# Patient Record
Sex: FEMALE | Race: WHITE | NOT HISPANIC OR LATINO
[De-identification: names, ages, dates, MRNs, and addresses within clinical notes are randomized per-mention and may not be internally consistent; named-entity substitution may affect disease eponyms.]

---

## 2021-10-01 ENCOUNTER — APPOINTMENT (OUTPATIENT)
Dept: DISASTER EMERGENCY | Facility: CLINIC | Age: 51
End: 2021-10-01

## 2021-10-01 DIAGNOSIS — Z01.818 ENCOUNTER FOR OTHER PREPROCEDURAL EXAMINATION: ICD-10-CM

## 2021-10-01 PROBLEM — Z00.00 ENCOUNTER FOR PREVENTIVE HEALTH EXAMINATION: Status: ACTIVE | Noted: 2021-10-01

## 2021-10-02 LAB — SARS-COV-2 N GENE NPH QL NAA+PROBE: NOT DETECTED

## 2021-10-03 ENCOUNTER — TRANSCRIPTION ENCOUNTER (OUTPATIENT)
Age: 51
End: 2021-10-03

## 2021-10-04 ENCOUNTER — OUTPATIENT (OUTPATIENT)
Dept: OUTPATIENT SERVICES | Facility: HOSPITAL | Age: 51
LOS: 1 days | Discharge: HOME | End: 2021-10-04

## 2021-10-04 VITALS
HEART RATE: 93 BPM | WEIGHT: 203.05 LBS | OXYGEN SATURATION: 97 % | HEIGHT: 70 IN | DIASTOLIC BLOOD PRESSURE: 75 MMHG | RESPIRATION RATE: 169 BRPM | TEMPERATURE: 98 F | SYSTOLIC BLOOD PRESSURE: 136 MMHG

## 2021-10-04 LAB
BASOPHILS # BLD AUTO: 0.01 K/UL — SIGNIFICANT CHANGE UP (ref 0–0.2)
BASOPHILS NFR BLD AUTO: 0.1 % — SIGNIFICANT CHANGE UP (ref 0–2)
EOSINOPHIL # BLD AUTO: 0 K/UL — SIGNIFICANT CHANGE UP (ref 0–0.5)
EOSINOPHIL NFR BLD AUTO: 0 % — SIGNIFICANT CHANGE UP (ref 0–6)
GLUCOSE BLDC GLUCOMTR-MCNC: 140 MG/DL — HIGH (ref 70–99)
GLUCOSE BLDC GLUCOMTR-MCNC: 143 MG/DL — HIGH (ref 70–99)
GLUCOSE BLDC GLUCOMTR-MCNC: 147 MG/DL — HIGH (ref 70–99)
GLUCOSE BLDC GLUCOMTR-MCNC: 171 MG/DL — HIGH (ref 70–99)
HCT VFR BLD CALC: 47 % — HIGH (ref 34.5–45)
HGB BLD-MCNC: 15.1 G/DL — SIGNIFICANT CHANGE UP (ref 11.5–15.5)
IMM GRANULOCYTES NFR BLD AUTO: 0.5 % — SIGNIFICANT CHANGE UP (ref 0–1.5)
LYMPHOCYTES # BLD AUTO: 0.85 K/UL — LOW (ref 1–3.3)
LYMPHOCYTES # BLD AUTO: 9.7 % — LOW (ref 13–44)
MCHC RBC-ENTMCNC: 29.6 PG — SIGNIFICANT CHANGE UP (ref 27–34)
MCHC RBC-ENTMCNC: 32.1 GM/DL — SIGNIFICANT CHANGE UP (ref 32–36)
MCV RBC AUTO: 92.2 FL — SIGNIFICANT CHANGE UP (ref 80–100)
MONOCYTES # BLD AUTO: 0.11 K/UL — SIGNIFICANT CHANGE UP (ref 0–0.9)
MONOCYTES NFR BLD AUTO: 1.3 % — LOW (ref 2–14)
NEUTROPHILS # BLD AUTO: 7.71 K/UL — HIGH (ref 1.8–7.4)
NEUTROPHILS NFR BLD AUTO: 88.4 % — HIGH (ref 43–77)
NRBC # BLD: 0 /100 WBCS — SIGNIFICANT CHANGE UP (ref 0–0)
PLATELET # BLD AUTO: 333 K/UL — SIGNIFICANT CHANGE UP (ref 150–400)
RBC # BLD: 5.1 M/UL — SIGNIFICANT CHANGE UP (ref 3.8–5.2)
RBC # FLD: 13.6 % — SIGNIFICANT CHANGE UP (ref 10.3–14.5)
WBC # BLD: 8.72 K/UL — SIGNIFICANT CHANGE UP (ref 3.8–10.5)
WBC # FLD AUTO: 8.72 K/UL — SIGNIFICANT CHANGE UP (ref 3.8–10.5)

## 2021-10-04 PROCEDURE — 99285 EMERGENCY DEPT VISIT HI MDM: CPT

## 2021-10-04 PROCEDURE — 93010 ELECTROCARDIOGRAM REPORT: CPT

## 2021-10-04 RX ORDER — SODIUM CHLORIDE 9 MG/ML
1000 INJECTION INTRAMUSCULAR; INTRAVENOUS; SUBCUTANEOUS ONCE
Refills: 0 | Status: COMPLETED | OUTPATIENT
Start: 2021-10-04 | End: 2021-10-04

## 2021-10-04 RX ORDER — FAMOTIDINE 10 MG/ML
20 INJECTION INTRAVENOUS ONCE
Refills: 0 | Status: COMPLETED | OUTPATIENT
Start: 2021-10-04 | End: 2021-10-04

## 2021-10-04 RX ORDER — ONDANSETRON 8 MG/1
4 TABLET, FILM COATED ORAL ONCE
Refills: 0 | Status: COMPLETED | OUTPATIENT
Start: 2021-10-04 | End: 2021-10-04

## 2021-10-04 NOTE — ED ADULT NURSE NOTE - CHIEF COMPLAINT QUOTE
Pt transfer from [facility] s/p placement of external dilaudid pump to rt shoulder today (currently turned off), unable to control N/V per report after multiple med s administered. Also has insulin pump.

## 2021-10-04 NOTE — ED ADULT NURSE NOTE - OBJECTIVE STATEMENT
Patient w/ intractable Patient w/ intractable nausea and vomitting episodes s/p Dilaudid pump implant at [facility], states has mild abdominal pain, last vomitting episode at 5:30pm today.

## 2021-10-04 NOTE — ED ADULT TRIAGE NOTE - CHIEF COMPLAINT QUOTE
Pt transfer from [facility] s/p placement of external dilaudid pump to rt shoulder today (currently turned off), unable to control N/V per report after multiple med s administered. Also has insulin pump.

## 2021-10-05 ENCOUNTER — INPATIENT (INPATIENT)
Facility: HOSPITAL | Age: 51
LOS: 1 days | Discharge: HOME | DRG: 392 | End: 2021-10-07
Attending: STUDENT IN AN ORGANIZED HEALTH CARE EDUCATION/TRAINING PROGRAM | Admitting: STUDENT IN AN ORGANIZED HEALTH CARE EDUCATION/TRAINING PROGRAM
Payer: MEDICARE

## 2021-10-05 DIAGNOSIS — E27.1 PRIMARY ADRENOCORTICAL INSUFFICIENCY: ICD-10-CM

## 2021-10-05 DIAGNOSIS — R10.9 UNSPECIFIED ABDOMINAL PAIN: ICD-10-CM

## 2021-10-05 DIAGNOSIS — E11.9 TYPE 2 DIABETES MELLITUS WITHOUT COMPLICATIONS: ICD-10-CM

## 2021-10-05 DIAGNOSIS — I10 ESSENTIAL (PRIMARY) HYPERTENSION: ICD-10-CM

## 2021-10-05 DIAGNOSIS — Z29.9 ENCOUNTER FOR PROPHYLACTIC MEASURES, UNSPECIFIED: ICD-10-CM

## 2021-10-05 DIAGNOSIS — K21.9 GASTRO-ESOPHAGEAL REFLUX DISEASE WITHOUT ESOPHAGITIS: ICD-10-CM

## 2021-10-05 DIAGNOSIS — R11.2 NAUSEA WITH VOMITING, UNSPECIFIED: ICD-10-CM

## 2021-10-05 LAB
A1C WITH ESTIMATED AVERAGE GLUCOSE RESULT: 6.3 % — HIGH (ref 4–5.6)
ALBUMIN SERPL ELPH-MCNC: 4.2 G/DL — SIGNIFICANT CHANGE UP (ref 3.3–5)
ALP SERPL-CCNC: 90 U/L — SIGNIFICANT CHANGE UP (ref 40–120)
ALT FLD-CCNC: 53 U/L — HIGH (ref 10–45)
ANION GAP SERPL CALC-SCNC: 14 MMOL/L — SIGNIFICANT CHANGE UP (ref 5–17)
ANION GAP SERPL CALC-SCNC: 14 MMOL/L — SIGNIFICANT CHANGE UP (ref 5–17)
APPEARANCE UR: ABNORMAL
APPEARANCE UR: ABNORMAL
AST SERPL-CCNC: 17 U/L — SIGNIFICANT CHANGE UP (ref 10–40)
BILIRUB SERPL-MCNC: 0.7 MG/DL — SIGNIFICANT CHANGE UP (ref 0.2–1.2)
BILIRUB UR-MCNC: NEGATIVE — SIGNIFICANT CHANGE UP
BILIRUB UR-MCNC: NEGATIVE — SIGNIFICANT CHANGE UP
BUN SERPL-MCNC: 14 MG/DL — SIGNIFICANT CHANGE UP (ref 7–23)
BUN SERPL-MCNC: 15 MG/DL — SIGNIFICANT CHANGE UP (ref 7–23)
CALCIUM SERPL-MCNC: 9.4 MG/DL — SIGNIFICANT CHANGE UP (ref 8.4–10.5)
CALCIUM SERPL-MCNC: 9.9 MG/DL — SIGNIFICANT CHANGE UP (ref 8.4–10.5)
CHLORIDE SERPL-SCNC: 101 MMOL/L — SIGNIFICANT CHANGE UP (ref 96–108)
CHLORIDE SERPL-SCNC: 102 MMOL/L — SIGNIFICANT CHANGE UP (ref 96–108)
CHOLEST SERPL-MCNC: 231 MG/DL — HIGH
CO2 SERPL-SCNC: 22 MMOL/L — SIGNIFICANT CHANGE UP (ref 22–31)
CO2 SERPL-SCNC: 23 MMOL/L — SIGNIFICANT CHANGE UP (ref 22–31)
COLOR SPEC: YELLOW — SIGNIFICANT CHANGE UP
COLOR SPEC: YELLOW — SIGNIFICANT CHANGE UP
CREAT SERPL-MCNC: 0.62 MG/DL — SIGNIFICANT CHANGE UP (ref 0.5–1.3)
CREAT SERPL-MCNC: 0.62 MG/DL — SIGNIFICANT CHANGE UP (ref 0.5–1.3)
DIFF PNL FLD: NEGATIVE — SIGNIFICANT CHANGE UP
DIFF PNL FLD: NEGATIVE — SIGNIFICANT CHANGE UP
ESTIMATED AVERAGE GLUCOSE: 134 MG/DL — HIGH (ref 68–114)
GLUCOSE BLDC GLUCOMTR-MCNC: 124 MG/DL — HIGH (ref 70–99)
GLUCOSE BLDC GLUCOMTR-MCNC: 131 MG/DL — HIGH (ref 70–99)
GLUCOSE BLDC GLUCOMTR-MCNC: 136 MG/DL — HIGH (ref 70–99)
GLUCOSE SERPL-MCNC: 161 MG/DL — HIGH (ref 70–99)
GLUCOSE SERPL-MCNC: 203 MG/DL — HIGH (ref 70–99)
GLUCOSE UR QL: >=1000
GLUCOSE UR QL: >=1000
HCT VFR BLD CALC: 41.5 % — SIGNIFICANT CHANGE UP (ref 34.5–45)
HDLC SERPL-MCNC: 68 MG/DL — SIGNIFICANT CHANGE UP
HGB BLD-MCNC: 13.5 G/DL — SIGNIFICANT CHANGE UP (ref 11.5–15.5)
KETONES UR-MCNC: 40 MG/DL
KETONES UR-MCNC: 40 MG/DL
LEUKOCYTE ESTERASE UR-ACNC: NEGATIVE — SIGNIFICANT CHANGE UP
LEUKOCYTE ESTERASE UR-ACNC: NEGATIVE — SIGNIFICANT CHANGE UP
LIPID PNL WITH DIRECT LDL SERPL: 125 MG/DL — HIGH
MAGNESIUM SERPL-MCNC: 1.9 MG/DL — SIGNIFICANT CHANGE UP (ref 1.6–2.6)
MCHC RBC-ENTMCNC: 30.1 PG — SIGNIFICANT CHANGE UP (ref 27–34)
MCHC RBC-ENTMCNC: 32.5 GM/DL — SIGNIFICANT CHANGE UP (ref 32–36)
MCV RBC AUTO: 92.4 FL — SIGNIFICANT CHANGE UP (ref 80–100)
NITRITE UR-MCNC: NEGATIVE — SIGNIFICANT CHANGE UP
NITRITE UR-MCNC: NEGATIVE — SIGNIFICANT CHANGE UP
NON HDL CHOLESTEROL: 163 MG/DL — HIGH
NRBC # BLD: 0 /100 WBCS — SIGNIFICANT CHANGE UP (ref 0–0)
PH UR: 5.5 — SIGNIFICANT CHANGE UP (ref 5–8)
PH UR: 5.5 — SIGNIFICANT CHANGE UP (ref 5–8)
PHOSPHATE SERPL-MCNC: 4.4 MG/DL — SIGNIFICANT CHANGE UP (ref 2.5–4.5)
PLATELET # BLD AUTO: 345 K/UL — SIGNIFICANT CHANGE UP (ref 150–400)
POTASSIUM SERPL-MCNC: 4.3 MMOL/L — SIGNIFICANT CHANGE UP (ref 3.5–5.3)
POTASSIUM SERPL-MCNC: 4.4 MMOL/L — SIGNIFICANT CHANGE UP (ref 3.5–5.3)
POTASSIUM SERPL-SCNC: 4.3 MMOL/L — SIGNIFICANT CHANGE UP (ref 3.5–5.3)
POTASSIUM SERPL-SCNC: 4.4 MMOL/L — SIGNIFICANT CHANGE UP (ref 3.5–5.3)
PROT SERPL-MCNC: 7.5 G/DL — SIGNIFICANT CHANGE UP (ref 6–8.3)
PROT UR-MCNC: NEGATIVE MG/DL — SIGNIFICANT CHANGE UP
PROT UR-MCNC: NEGATIVE MG/DL — SIGNIFICANT CHANGE UP
RBC # BLD: 4.49 M/UL — SIGNIFICANT CHANGE UP (ref 3.8–5.2)
RBC # FLD: 13.4 % — SIGNIFICANT CHANGE UP (ref 10.3–14.5)
SARS-COV-2 RNA SPEC QL NAA+PROBE: NEGATIVE — SIGNIFICANT CHANGE UP
SODIUM SERPL-SCNC: 138 MMOL/L — SIGNIFICANT CHANGE UP (ref 135–145)
SODIUM SERPL-SCNC: 138 MMOL/L — SIGNIFICANT CHANGE UP (ref 135–145)
SP GR SPEC: 1.02 — SIGNIFICANT CHANGE UP (ref 1–1.03)
SP GR SPEC: 1.02 — SIGNIFICANT CHANGE UP (ref 1–1.03)
TRIGL SERPL-MCNC: 188 MG/DL — HIGH
UROBILINOGEN FLD QL: 0.2 E.U./DL — SIGNIFICANT CHANGE UP
UROBILINOGEN FLD QL: 0.2 E.U./DL — SIGNIFICANT CHANGE UP
WBC # BLD: 8.7 K/UL — SIGNIFICANT CHANGE UP (ref 3.8–10.5)
WBC # FLD AUTO: 8.7 K/UL — SIGNIFICANT CHANGE UP (ref 3.8–10.5)

## 2021-10-05 PROCEDURE — 74019 RADEX ABDOMEN 2 VIEWS: CPT | Mod: 26

## 2021-10-05 PROCEDURE — 99223 1ST HOSP IP/OBS HIGH 75: CPT | Mod: GC,AI

## 2021-10-05 RX ORDER — INFLUENZA VIRUS VACCINE 15; 15; 15; 15 UG/.5ML; UG/.5ML; UG/.5ML; UG/.5ML
0.5 SUSPENSION INTRAMUSCULAR ONCE
Refills: 0 | Status: DISCONTINUED | OUTPATIENT
Start: 2021-10-05 | End: 2021-10-07

## 2021-10-05 RX ORDER — ATORVASTATIN CALCIUM 80 MG/1
40 TABLET, FILM COATED ORAL AT BEDTIME
Refills: 0 | Status: DISCONTINUED | OUTPATIENT
Start: 2021-10-05 | End: 2021-10-07

## 2021-10-05 RX ORDER — DIPHENHYDRAMINE HCL 50 MG
25 CAPSULE ORAL ONCE
Refills: 0 | Status: COMPLETED | OUTPATIENT
Start: 2021-10-05 | End: 2021-10-05

## 2021-10-05 RX ORDER — DICLOFENAC SODIUM 75 MG/1
25 TABLET, DELAYED RELEASE ORAL
Refills: 0 | Status: DISCONTINUED | OUTPATIENT
Start: 2021-10-05 | End: 2021-10-07

## 2021-10-05 RX ORDER — GLUCAGON INJECTION, SOLUTION 0.5 MG/.1ML
1 INJECTION, SOLUTION SUBCUTANEOUS ONCE
Refills: 0 | Status: DISCONTINUED | OUTPATIENT
Start: 2021-10-05 | End: 2021-10-07

## 2021-10-05 RX ORDER — ENOXAPARIN SODIUM 100 MG/ML
40 INJECTION SUBCUTANEOUS
Refills: 0 | Status: DISCONTINUED | OUTPATIENT
Start: 2021-10-05 | End: 2021-10-07

## 2021-10-05 RX ORDER — DEXTROSE 50 % IN WATER 50 %
25 SYRINGE (ML) INTRAVENOUS ONCE
Refills: 0 | Status: DISCONTINUED | OUTPATIENT
Start: 2021-10-05 | End: 2021-10-07

## 2021-10-05 RX ORDER — DEXTROSE 50 % IN WATER 50 %
12.5 SYRINGE (ML) INTRAVENOUS ONCE
Refills: 0 | Status: DISCONTINUED | OUTPATIENT
Start: 2021-10-05 | End: 2021-10-07

## 2021-10-05 RX ORDER — CHOLECALCIFEROL (VITAMIN D3) 125 MCG
2000 CAPSULE ORAL DAILY
Refills: 0 | Status: DISCONTINUED | OUTPATIENT
Start: 2021-10-05 | End: 2021-10-07

## 2021-10-05 RX ORDER — CHOLESTYRAMINE 4 G/9G
4 POWDER, FOR SUSPENSION ORAL
Refills: 0 | Status: DISCONTINUED | OUTPATIENT
Start: 2021-10-05 | End: 2021-10-07

## 2021-10-05 RX ORDER — SODIUM CHLORIDE 9 MG/ML
1000 INJECTION, SOLUTION INTRAVENOUS
Refills: 0 | Status: DISCONTINUED | OUTPATIENT
Start: 2021-10-05 | End: 2021-10-07

## 2021-10-05 RX ORDER — ONDANSETRON 8 MG/1
4 TABLET, FILM COATED ORAL EVERY 8 HOURS
Refills: 0 | Status: DISCONTINUED | OUTPATIENT
Start: 2021-10-05 | End: 2021-10-07

## 2021-10-05 RX ORDER — HYDROCORTISONE 20 MG
25 TABLET ORAL
Refills: 0 | Status: DISCONTINUED | OUTPATIENT
Start: 2021-10-05 | End: 2021-10-07

## 2021-10-05 RX ORDER — GABAPENTIN 400 MG/1
300 CAPSULE ORAL THREE TIMES A DAY
Refills: 0 | Status: DISCONTINUED | OUTPATIENT
Start: 2021-10-05 | End: 2021-10-07

## 2021-10-05 RX ORDER — HYDROCORTISONE 20 MG
50 TABLET ORAL ONCE
Refills: 0 | Status: COMPLETED | OUTPATIENT
Start: 2021-10-05 | End: 2021-10-05

## 2021-10-05 RX ORDER — SUCRALFATE 1 G
1 TABLET ORAL
Refills: 0 | Status: DISCONTINUED | OUTPATIENT
Start: 2021-10-05 | End: 2021-10-07

## 2021-10-05 RX ORDER — FAMOTIDINE 10 MG/ML
20 INJECTION INTRAVENOUS
Refills: 0 | Status: DISCONTINUED | OUTPATIENT
Start: 2021-10-05 | End: 2021-10-06

## 2021-10-05 RX ORDER — PANTOPRAZOLE SODIUM 20 MG/1
40 TABLET, DELAYED RELEASE ORAL ONCE
Refills: 0 | Status: COMPLETED | OUTPATIENT
Start: 2021-10-05 | End: 2021-10-05

## 2021-10-05 RX ORDER — HYDROMORPHONE HYDROCHLORIDE 2 MG/ML
4 INJECTION INTRAMUSCULAR; INTRAVENOUS; SUBCUTANEOUS EVERY 4 HOURS
Refills: 0 | Status: DISCONTINUED | OUTPATIENT
Start: 2021-10-05 | End: 2021-10-07

## 2021-10-05 RX ORDER — MONTELUKAST 4 MG/1
10 TABLET, CHEWABLE ORAL DAILY
Refills: 0 | Status: DISCONTINUED | OUTPATIENT
Start: 2021-10-05 | End: 2021-10-07

## 2021-10-05 RX ORDER — DEXTROSE 50 % IN WATER 50 %
15 SYRINGE (ML) INTRAVENOUS ONCE
Refills: 0 | Status: DISCONTINUED | OUTPATIENT
Start: 2021-10-05 | End: 2021-10-07

## 2021-10-05 RX ORDER — OLANZAPINE 15 MG/1
5 TABLET, FILM COATED ORAL ONCE
Refills: 0 | Status: COMPLETED | OUTPATIENT
Start: 2021-10-05 | End: 2021-10-05

## 2021-10-05 RX ORDER — CELECOXIB 200 MG/1
100 CAPSULE ORAL
Refills: 0 | Status: DISCONTINUED | OUTPATIENT
Start: 2021-10-05 | End: 2021-10-07

## 2021-10-05 RX ORDER — INSULIN LISPRO 100/ML
VIAL (ML) SUBCUTANEOUS
Refills: 0 | Status: DISCONTINUED | OUTPATIENT
Start: 2021-10-05 | End: 2021-10-07

## 2021-10-05 RX ADMIN — Medication 200 MILLIGRAM(S): at 22:39

## 2021-10-05 RX ADMIN — Medication 50 MILLIGRAM(S): at 01:04

## 2021-10-05 RX ADMIN — ENOXAPARIN SODIUM 40 MILLIGRAM(S): 100 INJECTION SUBCUTANEOUS at 21:33

## 2021-10-05 RX ADMIN — Medication 200 MILLIGRAM(S): at 06:31

## 2021-10-05 RX ADMIN — Medication 25 MILLIGRAM(S): at 02:36

## 2021-10-05 RX ADMIN — PANTOPRAZOLE SODIUM 40 MILLIGRAM(S): 20 TABLET, DELAYED RELEASE ORAL at 02:37

## 2021-10-05 RX ADMIN — Medication 0.5 MILLIGRAM(S): at 13:23

## 2021-10-05 RX ADMIN — Medication 4 MILLIGRAM(S): at 21:36

## 2021-10-05 RX ADMIN — Medication 25 MILLIGRAM(S): at 21:32

## 2021-10-05 RX ADMIN — ONDANSETRON 4 MILLIGRAM(S): 8 TABLET, FILM COATED ORAL at 17:19

## 2021-10-05 RX ADMIN — CELECOXIB 100 MILLIGRAM(S): 200 CAPSULE ORAL at 22:32

## 2021-10-05 RX ADMIN — SODIUM CHLORIDE 1000 MILLILITER(S): 9 INJECTION INTRAMUSCULAR; INTRAVENOUS; SUBCUTANEOUS at 00:33

## 2021-10-05 RX ADMIN — Medication 200 MILLIGRAM(S): at 15:17

## 2021-10-05 RX ADMIN — ONDANSETRON 4 MILLIGRAM(S): 8 TABLET, FILM COATED ORAL at 05:24

## 2021-10-05 RX ADMIN — Medication 0.5 MILLIGRAM(S): at 19:04

## 2021-10-05 RX ADMIN — Medication 25 MILLIGRAM(S): at 05:22

## 2021-10-05 RX ADMIN — GABAPENTIN 300 MILLIGRAM(S): 400 CAPSULE ORAL at 21:33

## 2021-10-05 RX ADMIN — FAMOTIDINE 20 MILLIGRAM(S): 10 INJECTION INTRAVENOUS at 00:44

## 2021-10-05 RX ADMIN — CELECOXIB 100 MILLIGRAM(S): 200 CAPSULE ORAL at 21:32

## 2021-10-05 RX ADMIN — ATORVASTATIN CALCIUM 40 MILLIGRAM(S): 80 TABLET, FILM COATED ORAL at 21:32

## 2021-10-05 RX ADMIN — ONDANSETRON 4 MILLIGRAM(S): 8 TABLET, FILM COATED ORAL at 00:43

## 2021-10-05 RX ADMIN — OLANZAPINE 5 MILLIGRAM(S): 15 TABLET, FILM COATED ORAL at 23:00

## 2021-10-05 NOTE — H&P ADULT - PROBLEM SELECTOR PLAN 1
Patient with chronic nausea, now presenting post intrathecal pump with one episode of NBNB vomiting  - Patient was not able to tolerate oral intake since the pump was placed  - per- her Surgeon, pump was discontinued  - s/p multiple Zofran with no relief  - Started on Tigen and Zofran, N/V well controlled with Tigen and Benadryl  - Obtain collateral from Dr. Malloy the surgeon for pain management recs and pump recs  - home pain management: Nexium 40mg BID, Voltaren TID and gabapentin 300mg TID Patient with chronic nausea, now presenting post intrathecal pump with one episode of NBNB vomiting  - Patient was not able to tolerate oral intake since the pump was placed  - per- her Surgeon, pump was discontinued  - s/p multiple Zofran with no relief  - Started on Tigen and Zofran, N/V well controlled with Tigen and Benadryl  - Obtain collateral from Dr. Malloy the surgeon for pain management recs and pump recs  - home pain management: Nexium 40mg BID, Voltaren TID and gabapentin 300mg TID    Addendum: Will trial Ativan prn, start Olanzapine tonight, watch for side effect symptoms, DDx: Less likely infectious, less likely anesthesia as would not persist, no indication there could be CSF leak or problem with pump, most likely exacerbation of chronic nausea related to chronic pancreatitis.

## 2021-10-05 NOTE — PROGRESS NOTE ADULT - PROBLEM SELECTOR PLAN 3
Plan: Pt with hx of addisons disease, on steroids at home  - Solucortef 50mg   - Once able to tolerate PO: c/w prednisone 4 mg daily and hydrocortisone 25 mg BID Plan: Pt with hx of addisons disease, on steroids at home  - Solucortef 50mg when unable to tolerate oral   - Once able to tolerate PO: c/w prednisone 4 mg daily and hydrocortisone 25 mg BID

## 2021-10-05 NOTE — PROGRESS NOTE ADULT - SUBJECTIVE AND OBJECTIVE BOX
Medicine Progress Note    Patient is a 51y old Female w/ PMH of Addisons disease, GERD/Gastritis with chronic nausea, chronic pancreatitis, mixed familial hyperlipidemia, diabetes, htn, and obesity who presented with a chief complaint of nausea and vomiting following intrathecal pain pump implantation    SUBJECTIVE / OVERNIGHT EVENTS: no acute events overnight. This morning still complaining of severe nausea with minimal improvement and inability to tolerate PO. Patient also experiencing her typical chronic abdominal pain, but she is unable to tolerate PO hydromorphone    ADDITIONAL REVIEW OF SYSTEMS: no fever, chills, headaches, dizziness, cp, sob, palpitations, dysuria     MEDICATIONS  (STANDING):  atorvastatin 40 milliGRAM(s) Oral at bedtime  cholecalciferol 2000 Unit(s) Oral daily  dextrose 40% Gel 15 Gram(s) Oral once  dextrose 5%. 1000 milliLiter(s) (50 mL/Hr) IV Continuous <Continuous>  dextrose 5%. 1000 milliLiter(s) (100 mL/Hr) IV Continuous <Continuous>  dextrose 50% Injectable 25 Gram(s) IV Push once  dextrose 50% Injectable 12.5 Gram(s) IV Push once  dextrose 50% Injectable 25 Gram(s) IV Push once  enoxaparin Injectable 40 milliGRAM(s) SubCutaneous two times a day  famotidine    Tablet 20 milliGRAM(s) Oral two times a day  gabapentin 300 milliGRAM(s) Oral three times a day  glucagon  Injectable 1 milliGRAM(s) IntraMuscular once  hydrocortisone 25 milliGRAM(s) Oral two times a day  influenza   Vaccine 0.5 milliLiter(s) IntraMuscular once  insulin lispro (ADMELOG) corrective regimen sliding scale   SubCutaneous three times a day before meals  montelukast 10 milliGRAM(s) Oral daily  predniSONE   Tablet 4 milliGRAM(s) Oral daily  sucralfate 1 Gram(s) Oral four times a day    MEDICATIONS  (PRN):  celecoxib 100 milliGRAM(s) Oral two times a day PRN Moderate Pain (4 - 6)  cholestyramine Powder (Sugar-Free) 4 Gram(s) Oral four times a day PRN diarrhea  diclofenac 25 milliGRAM(s) Oral four times a day PRN Moderate Pain (4 - 6)  HYDROmorphone   Tablet 4 milliGRAM(s) Oral every 4 hours PRN Severe Pain (7 - 10)  ondansetron Injectable 4 milliGRAM(s) IV Push every 8 hours PRN Nausea and/or Vomiting  trimethobenzamide Injectable 200 milliGRAM(s) IntraMuscular every 8 hours PRN Nausea and/or Vomiting    CAPILLARY BLOOD GLUCOSE      POCT Blood Glucose.: 171 mg/dL (04 Oct 2021 20:58)  POCT Blood Glucose.: 140 mg/dL (04 Oct 2021 16:03)  POCT Blood Glucose.: 147 mg/dL (04 Oct 2021 13:13)    I&O's Summary      PHYSICAL EXAM:  Vital Signs Last 24 Hrs  T(C): 36.9 (05 Oct 2021 08:09), Max: 37 (05 Oct 2021 04:39)  T(F): 98.5 (05 Oct 2021 08:09), Max: 98.6 (05 Oct 2021 04:39)  HR: 92 (05 Oct 2021 08:09) (87 - 98)  BP: 136/80 (05 Oct 2021 08:09) (134/68 - 149/80)  BP(mean): 103 (05 Oct 2021 04:39) (103 - 103)  RR: 17 (05 Oct 2021 08:09) (16 - 169)  SpO2: 96% (05 Oct 2021 08:09) (96% - 98%)    CONSTITUTIONAL: obese women, NAD, resting comfortably  ENMT: normal dentition, dry flushing in cheeks   RESPIRATORY: Normal respiratory effort; lungs are clear to auscultation bilaterally   CARDIOVASCULAR: Regular rate and rhythm, normal S1 and S2, no murmur/rub/gallop; No lower extremity edema; Peripheral pulses are 2+ bilaterally  ABDOMEN: tenderness to palpation in epigastric area and upper quadrants, hypoactive bowel sounds, no rebound/guarding; No hepatosplenomegaly  PSYCH: AxO x3, affect appropriate  NEUROLOGY: CN 2-12 are intact and symmetric; no gross sensory deficits   SKIN: No rashes; no palpable lesions    LABS:                        13.5   8.70  )-----------( 345      ( 05 Oct 2021 07:54 )             41.5     10-    138  |  101  |  15  ----------------------------<  161<H>  4.3   |  23  |  0.62    Ca    9.4      05 Oct 2021 07:54  Phos  4.4     10-05  Mg     1.9     10-05    TPro  7.5  /  Alb  4.2  /  TBili  0.7  /  DBili  x   /  AST  17  /  ALT  53<H>  /  AlkPhos  90  10-      Urinalysis Basic - ( 05 Oct 2021 01:09 )    Color: Yellow / Appearance: SL Cloudy / S.025 / pH: x  Gluc: x / Ketone: 40 mg/dL  / Bili: Negative / Urobili: 0.2 E.U./dL   Blood: x / Protein: NEGATIVE mg/dL / Nitrite: NEGATIVE   Leuk Esterase: NEGATIVE / RBC: x / WBC x   Sq Epi: x / Non Sq Epi: x / Bacteria: x        COVID-19 PCR: Negative (04 Oct 2021 23:39)      RADIOLOGY & ADDITIONAL TESTS:  Imaging from Last 24 Hours:    Electrocardiogram/QTc Interval:    COORDINATION OF CARE:  Care Discussed with Consultants/Other Providers:   Medicine Progress Note    Patient is a 51y old Female w/ PMH of Addisons disease, GERD/Gastritis with chronic nausea, chronic pancreatitis, mixed familial hyperlipidemia, diabetes, htn, and obesity who presented with a chief complaint of nausea and vomiting following intrathecal pain pump implantation    SUBJECTIVE / OVERNIGHT EVENTS: no acute events overnight. This morning still complaining of severe nausea with minimal improvement and inability to tolerate PO. Patient also experiencing her typical chronic abdominal pain, but is unable to tolerate PO hydromorphone    ADDITIONAL REVIEW OF SYSTEMS: no fever, chills, headache, dizziness, cp, sob, palpitations, dysuria     MEDICATIONS  (STANDING):  atorvastatin 40 milliGRAM(s) Oral at bedtime  cholecalciferol 2000 Unit(s) Oral daily  dextrose 40% Gel 15 Gram(s) Oral once  dextrose 5%. 1000 milliLiter(s) (50 mL/Hr) IV Continuous <Continuous>  dextrose 5%. 1000 milliLiter(s) (100 mL/Hr) IV Continuous <Continuous>  dextrose 50% Injectable 25 Gram(s) IV Push once  dextrose 50% Injectable 12.5 Gram(s) IV Push once  dextrose 50% Injectable 25 Gram(s) IV Push once  enoxaparin Injectable 40 milliGRAM(s) SubCutaneous two times a day  famotidine    Tablet 20 milliGRAM(s) Oral two times a day  gabapentin 300 milliGRAM(s) Oral three times a day  glucagon  Injectable 1 milliGRAM(s) IntraMuscular once  hydrocortisone 25 milliGRAM(s) Oral two times a day  influenza   Vaccine 0.5 milliLiter(s) IntraMuscular once  insulin lispro (ADMELOG) corrective regimen sliding scale   SubCutaneous three times a day before meals  montelukast 10 milliGRAM(s) Oral daily  predniSONE   Tablet 4 milliGRAM(s) Oral daily  sucralfate 1 Gram(s) Oral four times a day    MEDICATIONS  (PRN):  celecoxib 100 milliGRAM(s) Oral two times a day PRN Moderate Pain (4 - 6)  cholestyramine Powder (Sugar-Free) 4 Gram(s) Oral four times a day PRN diarrhea  diclofenac 25 milliGRAM(s) Oral four times a day PRN Moderate Pain (4 - 6)  HYDROmorphone   Tablet 4 milliGRAM(s) Oral every 4 hours PRN Severe Pain (7 - 10)  ondansetron Injectable 4 milliGRAM(s) IV Push every 8 hours PRN Nausea and/or Vomiting  trimethobenzamide Injectable 200 milliGRAM(s) IntraMuscular every 8 hours PRN Nausea and/or Vomiting    CAPILLARY BLOOD GLUCOSE      POCT Blood Glucose.: 171 mg/dL (04 Oct 2021 20:58)  POCT Blood Glucose.: 140 mg/dL (04 Oct 2021 16:03)  POCT Blood Glucose.: 147 mg/dL (04 Oct 2021 13:13)    I&O's Summary      PHYSICAL EXAM:  Vital Signs Last 24 Hrs  T(C): 36.9 (05 Oct 2021 08:09), Max: 37 (05 Oct 2021 04:39)  T(F): 98.5 (05 Oct 2021 08:09), Max: 98.6 (05 Oct 2021 04:39)  HR: 92 (05 Oct 2021 08:09) (87 - 98)  BP: 136/80 (05 Oct 2021 08:09) (134/68 - 149/80)  BP(mean): 103 (05 Oct 2021 04:39) (103 - 103)  RR: 17 (05 Oct 2021 08:09) (16 - 169)  SpO2: 96% (05 Oct 2021 08:09) (96% - 98%)    CONSTITUTIONAL: obese women, NAD, resting comfortably  ENMT: normal dentition, dry flushing in cheeks   RESPIRATORY: Normal respiratory effort; lungs are clear to auscultation bilaterally   CARDIOVASCULAR: Regular rate and rhythm, normal S1 and S2, no murmur/rub/gallop; No lower extremity edema; Peripheral pulses are 2+ bilaterally  ABDOMEN: tenderness to palpation in epigastric area and upper R&L quadrants, hypoactive bowel sounds, no rebound/guarding; No hepatosplenomegaly  PSYCH: AxO x3, affect and speech appropriate  NEUROLOGY: CN 2-12 are intact and symmetric; no gross sensory deficits   SKIN: No rashes; no palpable lesions    LABS:                        13.5   8.70  )-----------( 345      ( 05 Oct 2021 07:54 )             41.5     10-05    138  |  101  |  15  ----------------------------<  161<H>  4.3   |  23  |  0.62    Ca    9.4      05 Oct 2021 07:54  Phos  4.4     10-05  Mg     1.9     10-05    TPro  7.5  /  Alb  4.2  /  TBili  0.7  /  DBili  x   /  AST  17  /  ALT  53<H>  /  AlkPhos  90  10-      Urinalysis Basic - ( 05 Oct 2021 01:09 )    Color: Yellow / Appearance: SL Cloudy / S.025 / pH: x  Gluc: x / Ketone: 40 mg/dL  / Bili: Negative / Urobili: 0.2 E.U./dL   Blood: x / Protein: NEGATIVE mg/dL / Nitrite: NEGATIVE   Leuk Esterase: NEGATIVE / RBC: x / WBC x   Sq Epi: x / Non Sq Epi: x / Bacteria: x        COVID-19 PCR: Negative (04 Oct 2021 23:39)      RADIOLOGY & ADDITIONAL TESTS:  Imaging from Last 24 Hours:    Electrocardiogram/QTc Interval:    COORDINATION OF CARE:  Care Discussed with Consultants/Other Providers:   Medicine Progress Note    Patient is a 51y old Female w/ PMH of Addisons disease, GERD/Gastritis with chronic nausea, chronic pancreatitis, mixed familial hyperlipidemia, diabetes, htn, and obesity who presented with a chief complaint of nausea and vomiting following intrathecal pain pump implantation    OVERNIGHT EVENTS: no acute events overnight.     SUBJECTIVE: This morning still complaining of severe nausea with minimal improvement and inability to tolerate PO. Patient also experiencing her typical chronic abdominal pain, but is unable to tolerate PO hydromorphone    ADDITIONAL REVIEW OF SYSTEMS: no fever, chills, headache, dizziness, cp, sob, palpitations, dysuria     MEDICATIONS  (STANDING):  atorvastatin 40 milliGRAM(s) Oral at bedtime  cholecalciferol 2000 Unit(s) Oral daily  dextrose 40% Gel 15 Gram(s) Oral once  dextrose 5%. 1000 milliLiter(s) (50 mL/Hr) IV Continuous <Continuous>  dextrose 5%. 1000 milliLiter(s) (100 mL/Hr) IV Continuous <Continuous>  dextrose 50% Injectable 25 Gram(s) IV Push once  dextrose 50% Injectable 12.5 Gram(s) IV Push once  dextrose 50% Injectable 25 Gram(s) IV Push once  enoxaparin Injectable 40 milliGRAM(s) SubCutaneous two times a day  famotidine    Tablet 20 milliGRAM(s) Oral two times a day  gabapentin 300 milliGRAM(s) Oral three times a day  glucagon  Injectable 1 milliGRAM(s) IntraMuscular once  hydrocortisone 25 milliGRAM(s) Oral two times a day  influenza   Vaccine 0.5 milliLiter(s) IntraMuscular once  insulin lispro (ADMELOG) corrective regimen sliding scale   SubCutaneous three times a day before meals  montelukast 10 milliGRAM(s) Oral daily  predniSONE   Tablet 4 milliGRAM(s) Oral daily  sucralfate 1 Gram(s) Oral four times a day    MEDICATIONS  (PRN):  celecoxib 100 milliGRAM(s) Oral two times a day PRN Moderate Pain (4 - 6)  cholestyramine Powder (Sugar-Free) 4 Gram(s) Oral four times a day PRN diarrhea  diclofenac 25 milliGRAM(s) Oral four times a day PRN Moderate Pain (4 - 6)  HYDROmorphone   Tablet 4 milliGRAM(s) Oral every 4 hours PRN Severe Pain (7 - 10)  ondansetron Injectable 4 milliGRAM(s) IV Push every 8 hours PRN Nausea and/or Vomiting  trimethobenzamide Injectable 200 milliGRAM(s) IntraMuscular every 8 hours PRN Nausea and/or Vomiting    CAPILLARY BLOOD GLUCOSE      POCT Blood Glucose.: 171 mg/dL (04 Oct 2021 20:58)  POCT Blood Glucose.: 140 mg/dL (04 Oct 2021 16:03)  POCT Blood Glucose.: 147 mg/dL (04 Oct 2021 13:13)    I&O's Summary      PHYSICAL EXAM:  Vital Signs Last 24 Hrs  T(C): 36.9 (05 Oct 2021 08:09), Max: 37 (05 Oct 2021 04:39)  T(F): 98.5 (05 Oct 2021 08:09), Max: 98.6 (05 Oct 2021 04:39)  HR: 92 (05 Oct 2021 08:09) (87 - 98)  BP: 136/80 (05 Oct 2021 08:09) (134/68 - 149/80)  BP(mean): 103 (05 Oct 2021 04:39) (103 - 103)  RR: 17 (05 Oct 2021 08:09) (16 - 169)  SpO2: 96% (05 Oct 2021 08:09) (96% - 98%)    CONSTITUTIONAL: obese women, NAD, resting comfortably  ENMT: normal dentition, dry flushing in cheeks   RESPIRATORY: Normal respiratory effort; lungs are clear to auscultation bilaterally   CARDIOVASCULAR: Regular rate and rhythm, normal S1 and S2, no murmur/rub/gallop; No lower extremity edema; Peripheral pulses are 2+ bilaterally  ABDOMEN: tenderness to palpation in epigastric area and upper R&L quadrants, hypoactive bowel sounds, no rebound/guarding; No hepatosplenomegaly  PSYCH: AxO x3, affect and speech appropriate  NEUROLOGY: CN 2-12 are intact and symmetric; no gross sensory deficits   SKIN: No rashes; no palpable lesions    LABS:                        13.5   8.70  )-----------( 345      ( 05 Oct 2021 07:54 )             41.5     10-05    138  |  101  |  15  ----------------------------<  161<H>  4.3   |  23  |  0.62    Ca    9.4      05 Oct 2021 07:54  Phos  4.4     10-05  Mg     1.9     10-05    TPro  7.5  /  Alb  4.2  /  TBili  0.7  /  DBili  x   /  AST  17  /  ALT  53<H>  /  AlkPhos  90  10-      Urinalysis Basic - ( 05 Oct 2021 01:09 )    Color: Yellow / Appearance: SL Cloudy / S.025 / pH: x  Gluc: x / Ketone: 40 mg/dL  / Bili: Negative / Urobili: 0.2 E.U./dL   Blood: x / Protein: NEGATIVE mg/dL / Nitrite: NEGATIVE   Leuk Esterase: NEGATIVE / RBC: x / WBC x   Sq Epi: x / Non Sq Epi: x / Bacteria: x        COVID-19 PCR: Negative (04 Oct 2021 23:39)      RADIOLOGY & ADDITIONAL TESTS:  Imaging from Last 24 Hours:    Electrocardiogram/QTc Interval:    COORDINATION OF CARE:  Care Discussed with Consultants/Other Providers:

## 2021-10-05 NOTE — ED PROVIDER NOTE - CLINICAL SUMMARY MEDICAL DECISION MAKING FREE TEXT BOX
intractible nausea post intrathecal pump implant earlier today at [facility] despite numerous antiemetic meds/ ivf. history of chronic nausea but states much worse today. no fever/ signs of infection other than some reported chills. labs done, normal wbc, electrolytes, renal function, ua neg for uti. given additional ivf/ zofran/ pepcid/ benadryl/ protonix without improvement in nausea. will admit for further management
0

## 2021-10-05 NOTE — PROGRESS NOTE ADULT - PROBLEM SELECTOR PLAN 1
Plan: Pt with hx of chronic nausea, now with severe intractable nausea post intrathecal pump placement with one episode of NBNB vomiting. Pt unable to tolerate PO at this time  - Pump discontinued  - s/p Zolfran with no relief  - Minimal relief with Tigen and Benadryl   - Started on Olanzapine Plan: Pt with hx of chronic nausea, presented with severe intractable nausea post intrathecal pump placement with one episode of NBNB vomiting. Pt minimally improved. Pt unable to tolerate PO at this time  - Pump discontinued  - s/p Zolfran with no relief  - Minimal relief with Tigen and Benadryl   - Started on Ativan Plan: Pt with hx of chronic nausea, presented with severe intractable nausea post intrathecal pump placement with one episode of NBNB vomiting. Likely 2/2 chronic nausea exacerbation vs. intestinal obstruction. CSF leak or anesthesia side effect unlikely. Pt minimally improved. Pt unable to tolerate PO at this time  - Pump discontinued  - s/p Zolfran with no relief  - Minimal relief with Tigen and Benadryl   - Started on Ativan, monitor side effects   - f/u upright Abd Xray

## 2021-10-05 NOTE — PROGRESS NOTE ADULT - PROBLEM SELECTOR PLAN 2
Plan: Pt with chronic abdominal pain due to chronic pancreatitis and gastritis. Currently experiencing abdominal pain. Intrathecal pump d/c due to nausea and unable to tolerate PO opiates  - home pain management: Gabapentin 300mg, Nexium mg BID  - consult pain management for recs on opioid regimen and when to restart intrathecal pump Plan: Pt with chronic abdominal pain due to chronic pancreatitis and gastritis. Currently experiencing abdominal pain. Intrathecal pump d/c due to nausea and pt unable to tolerate PO opiates  - c/w Gabapentin 300mg, Nexium mg BID  - consult pain management for recs on opioid regimen and when to restart intrathecal pump

## 2021-10-05 NOTE — PROGRESS NOTE ADULT - PROBLEM SELECTOR PLAN 4
Plan: Pt with hx of diabetes 2/2 chronic pancreatitis   - c/w home meds: Invokana 300 mg daily, metformin 1000 mg BID, Ozempic 2g/1.5ml sq, Humlog 100 units/ml (insulin pump)  - f/u consult Endo for insulin pump management Plan: Pt with hx of diabetes 2/2 chronic pancreatitis. HbA1c 6.3  - c/w insulin lispro (ADMELOG) sliding scale sq TID  - c/w home meds: Invokana 300 mg daily, metformin 1000 mg BID when able to tolerate oral    - f/u consult Endo for insulin pump management (Humalog 100 units/ml

## 2021-10-05 NOTE — ED PROVIDER NOTE - OBJECTIVE STATEMENT
history of addisons disease, gerd with chronic nausea, chronic pancreatitis, diabetes, htn, obesity, presents from [facility] with intractable nausea after intrathecal pump implantation earlier today.  Vomited x1. Says she also has chills. Per records, was given propofol and versed for procedure. Hydrocortisone 100mg, zofran 4mg, followed by zofran 4mg at 1345, dilaudid 0.5mg at 1350, phenergan 6mg at 1435, pepcid 20mg at 1505, zofran 4mg at 1905. Reports she has chronic abdominal pain but unchanged today. No fever. Takes zofran at home regularly and usually helps but today didn't. Reports all her doctors are at [facility] but they wouldn't transfer her there today. In the past she says someone has given her octreotide for nausea which helped.

## 2021-10-05 NOTE — H&P ADULT - PROBLEM SELECTOR PLAN 2
Patient with hx of addisons, on steroids at home  - c/w prednisone 4mg daily  - c/w hyrdocortisone 25mg BID  - PRN solucortef 50mg when unable tolerate oral

## 2021-10-05 NOTE — H&P ADULT - NSHPPHYSICALEXAM_GEN_ALL_CORE
Constitutional: Obese woman NAD, comfortable in bed.  HEENT: NC/AT, PERRLA, EOMI, no conjunctival pallor or scleral icterus, MMM  Neck: Supple, no JVD  Respiratory: CTA B/L. No w/r/r.   Cardiovascular: RRR, normal S1 and S2, no m/r/g.   Gastrointestinal: +BS, soft NTND, no guarding or rebound tenderness, no palpable masses, R. flank with pump insertion sight with no e/e/d   Extremities: wwp; no cyanosis, clubbing or edema.   Vascular: Pulses equal and strong throughout.   Neurological: AAOx3, no CN deficits, strength and sensation intact throughout.   Skin: No gross skin abnormalities or rashes Vital Signs Last 24 Hrs  T(C): 36.8 (05 Oct 2021 15:43), Max: 37 (05 Oct 2021 04:39)  T(F): 98.2 (05 Oct 2021 15:43), Max: 98.6 (05 Oct 2021 04:39)  HR: 92 (05 Oct 2021 15:43) (87 - 98)  BP: 115/73 (05 Oct 2021 15:43) (115/73 - 149/80)  BP(mean): 103 (05 Oct 2021 04:39) (103 - 103)  RR: 18 (05 Oct 2021 15:43) (16 - 169)  SpO2: 97% (05 Oct 2021 15:43) (96% - 98%)    Constitutional: Obese woman NAD, comfortable in bed.  HEENT: NC/AT, PERRLA, EOMI, no conjunctival pallor or scleral icterus, MMM  Neck: Supple, no JVD  Respiratory: CTA B/L. No w/r/r.   Cardiovascular: RRR, normal S1 and S2, no m/r/g.   Gastrointestinal: +BS, soft NTND, no guarding or rebound tenderness, no palpable masses, R. flank with pump insertion sight with no e/e/d   Extremities: wwp; no cyanosis, clubbing or edema.   Vascular: Pulses equal and strong throughout.   Neurological: AAOx3, no CN deficits, strength and sensation intact throughout.   Skin: No gross skin abnormalities or rashes  Psych: Normal AFfect

## 2021-10-05 NOTE — ED CLERICAL - NS ED CLERK NOTE PRE-ARRIVAL INFORMATION; ADDITIONAL PRE-ARRIVAL INFORMATION
from [facility]. chronic pain. had intra-thecal catheter placed by DR. Thornton for chronic pain. Pt states she is very nauseated and unable to go home. Transferring to [facility] ED for further management,

## 2021-10-05 NOTE — H&P ADULT - NSHPLABSRESULTS_GEN_ALL_CORE
LABS:                         15.1   8.72  )-----------( 333      ( 04 Oct 2021 23:39 )             47.0     10-04    138  |  102  |  14  ----------------------------<  203<H>  4.4   |  22  |  0.62    Ca    9.9      04 Oct 2021 23:39    TPro  7.5  /  Alb  4.2  /  TBili  0.7  /  DBili  x   /  AST  17  /  ALT  53<H>  /  AlkPhos  90  10-04      Urinalysis Basic - ( 05 Oct 2021 01:09 )    Color: Yellow / Appearance: SL Cloudy / S.025 / pH: x  Gluc: x / Ketone: 40 mg/dL  / Bili: Negative / Urobili: 0.2 E.U./dL   Blood: x / Protein: NEGATIVE mg/dL / Nitrite: NEGATIVE   Leuk Esterase: NEGATIVE / RBC: x / WBC x   Sq Epi: x / Non Sq Epi: x / Bacteria: x                RADIOLOGY, EKG & ADDITIONAL TESTS:

## 2021-10-05 NOTE — H&P ADULT - HISTORY OF PRESENT ILLNESS
50yo F with PMH of leah's disease, GERD with chronic nausea, chronic pancreatitis, diabetes, htn, obesity, presents from [facility] with intractable nausea after intrathecal pump implantation earlier today.  Vomited x1 NBNB. Says she also has chills. Per records, was given propofol and versed for procedure. Reports she has chronic abdominal pain but unchanged today. No fever. Takes zofran at home regularly and usually helps but today didn't. Reports all her doctors are at [facility] but they wouldn't transfer her there today. In the past she says someone has given her octreotide for nausea which helped. Patient denies h/d, chills, cp, palpitations, sob, leg swelling, rashes, dysuria, and changes in BM.     In the ED- VS were T 98, HR 93, /75, RR 16, sats 97% on RA   Labs significant for cbc/ bmp/ UA wnl  EKG showed NSR @ 94, , no ST changes/ TWI  Given Benadryl Pepcid solumedrol, Zofran and Protonix

## 2021-10-05 NOTE — H&P ADULT - PROBLEM SELECTOR PLAN 4
F: s/p 1L NS  E: Replete as necessary K>4 Mg>2  N: DASH diet     DVT Prophylaxis: Lovenox 40mg BID  GI prophylaxis: Protonix 40mg daily  CODE STATUS: FULL

## 2021-10-05 NOTE — PROGRESS NOTE ADULT - ASSESSMENT
Patient is a 51y old Female w/ PMH of Addisons disease, GERD/Gastritis with chronic nausea, chronic pancreatitis with chronic abdominal pain, mixed familial hyperlipidemia, diabetes, htn, and obesity who presented with a chief complaint of nausea and vomiting following intrathecal pain pump implantation  Still complaining of severe nausea with minimal improvement and inability to tolerate PO. Patient also experiencing her typical chronic abdominal pain, but is unable to tolerate PO hydromorphone. Pt has not defecated since 10/3. PE shows hypoactive bowel sounds with tenderness to palpation in the upper quadrants and epigastric area.  Patient is a 51y old Female w/ PMH of Addisons disease, GERD/Gastritis with chronic nausea, chronic pancreatitis with chronic abdominal pain, mixed familial hyperlipidemia, diabetes, htn, and obesity who presented with a chief complaint of nausea and vomiting following intrathecal pain pump implantation. Still complaining of severe nausea with minimal improvement and inability to tolerate PO. Patient also experiencing her typical chronic abdominal pain, but is unable to tolerate PO hydromorphone. Pt has not defecated since 10/3. PE shows hypoactive bowel sounds with tenderness to palpation in the upper quadrants and epigastric area.  Patient is a 51y old Female w/ PMH of Addisons disease, GERD/Gastritis with chronic nausea, chronic pancreatitis with chronic abdominal pain, mixed familial hyperlipidemia, diabetes, htn, and obesity who presented with a chief complaint of nausea and vomiting following intrathecal pain pump implantation. Still complaining of severe nausea with minimal improvement and inability to tolerate PO. Patient also experiencing her typical chronic abdominal pain, as intrathecal pump turned off due to nausea and pt unable to tolerate PO hydromorphone. Pt also constipated with last defecation on 10/3. PE shows hypoactive bowel sounds with tenderness to palpation in the upper quadrants and epigastric area.

## 2021-10-05 NOTE — ED PROVIDER NOTE - MUSCULOSKELETAL, MLM
Spine appears normal, range of motion is not limited, no muscle or joint tenderness. intrathecal pump low back.

## 2021-10-05 NOTE — H&P ADULT - ATTENDING COMMENTS
50yo F with PMH of leah's disease, GERD with chronic nausea, chronic pancreatitis, diabetes, htn, obesity, presents from [facility] with intractable nausea after intrathecal pump implantation earlier today.  Vomited x1 NBNB.    Labs and imaging reviewed with primary team. Adjustments to plan made above    Problem List:  1. Intractable N/V  2. Chronic Pancreatitis/Chronic Pain  3. [place]'s Disease  4. GERD  5. HTN  6. Obesity

## 2021-10-05 NOTE — ED PROVIDER NOTE - PROGRESS NOTE DETAILS
reports she didn't take her normal hydrocortisone evening dose. usually takes 50mg if had extra stressor. still intractable nausea. requesting some protonix and benadryl. will admit for further treatment of intractable nausea

## 2021-10-05 NOTE — H&P ADULT - ASSESSMENT
52yo F with PMH of leah's disease, GERD with chronic nausea, chronic pancreatitis, diabetes, htn, obesity, presents from [facility] with intractable nausea after intrathecal pump implantation earlier today.  Vomited x1 NBNB.

## 2021-10-05 NOTE — ED PROVIDER NOTE - NSICDXPASTMEDICALHX_GEN_ALL_CORE_FT
PAST MEDICAL HISTORY:  David's disease     Chronic nausea     GERD (gastroesophageal reflux disease)     HTN (hypertension)

## 2021-10-06 ENCOUNTER — TRANSCRIPTION ENCOUNTER (OUTPATIENT)
Age: 51
End: 2021-10-06

## 2021-10-06 DIAGNOSIS — J45.991 COUGH VARIANT ASTHMA: ICD-10-CM

## 2021-10-06 LAB
ANION GAP SERPL CALC-SCNC: 12 MMOL/L — SIGNIFICANT CHANGE UP (ref 5–17)
BUN SERPL-MCNC: 18 MG/DL — SIGNIFICANT CHANGE UP (ref 7–23)
CALCIUM SERPL-MCNC: 9.5 MG/DL — SIGNIFICANT CHANGE UP (ref 8.4–10.5)
CHLORIDE SERPL-SCNC: 104 MMOL/L — SIGNIFICANT CHANGE UP (ref 96–108)
CO2 SERPL-SCNC: 20 MMOL/L — LOW (ref 22–31)
COVID-19 SPIKE DOMAIN AB INTERP: POSITIVE
COVID-19 SPIKE DOMAIN ANTIBODY RESULT: >250 U/ML — HIGH
CREAT SERPL-MCNC: 0.66 MG/DL — SIGNIFICANT CHANGE UP (ref 0.5–1.3)
CULTURE RESULTS: NO GROWTH — SIGNIFICANT CHANGE UP
GLUCOSE BLDC GLUCOMTR-MCNC: 136 MG/DL — HIGH (ref 70–99)
GLUCOSE SERPL-MCNC: 147 MG/DL — HIGH (ref 70–99)
HCT VFR BLD CALC: 40.4 % — SIGNIFICANT CHANGE UP (ref 34.5–45)
HGB BLD-MCNC: 13.3 G/DL — SIGNIFICANT CHANGE UP (ref 11.5–15.5)
MCHC RBC-ENTMCNC: 30.5 PG — SIGNIFICANT CHANGE UP (ref 27–34)
MCHC RBC-ENTMCNC: 32.9 GM/DL — SIGNIFICANT CHANGE UP (ref 32–36)
MCV RBC AUTO: 92.7 FL — SIGNIFICANT CHANGE UP (ref 80–100)
NRBC # BLD: 0 /100 WBCS — SIGNIFICANT CHANGE UP (ref 0–0)
PLATELET # BLD AUTO: 316 K/UL — SIGNIFICANT CHANGE UP (ref 150–400)
POTASSIUM SERPL-MCNC: 4.7 MMOL/L — SIGNIFICANT CHANGE UP (ref 3.5–5.3)
POTASSIUM SERPL-SCNC: 4.7 MMOL/L — SIGNIFICANT CHANGE UP (ref 3.5–5.3)
RBC # BLD: 4.36 M/UL — SIGNIFICANT CHANGE UP (ref 3.8–5.2)
RBC # FLD: 13.8 % — SIGNIFICANT CHANGE UP (ref 10.3–14.5)
SARS-COV-2 IGG+IGM SERPL QL IA: >250 U/ML — HIGH
SARS-COV-2 IGG+IGM SERPL QL IA: POSITIVE
SODIUM SERPL-SCNC: 136 MMOL/L — SIGNIFICANT CHANGE UP (ref 135–145)
SPECIMEN SOURCE: SIGNIFICANT CHANGE UP
WBC # BLD: 8.81 K/UL — SIGNIFICANT CHANGE UP (ref 3.8–10.5)
WBC # FLD AUTO: 8.81 K/UL — SIGNIFICANT CHANGE UP (ref 3.8–10.5)

## 2021-10-06 PROCEDURE — 70450 CT HEAD/BRAIN W/O DYE: CPT | Mod: 26

## 2021-10-06 PROCEDURE — 99233 SBSQ HOSP IP/OBS HIGH 50: CPT | Mod: GC

## 2021-10-06 PROCEDURE — 74177 CT ABD & PELVIS W/CONTRAST: CPT | Mod: 26

## 2021-10-06 RX ORDER — OLANZAPINE 15 MG/1
5 TABLET, FILM COATED ORAL AT BEDTIME
Refills: 0 | Status: DISCONTINUED | OUTPATIENT
Start: 2021-10-06 | End: 2021-10-07

## 2021-10-06 RX ORDER — PROCHLORPERAZINE MALEATE 5 MG
10 TABLET ORAL EVERY 8 HOURS
Refills: 0 | Status: DISCONTINUED | OUTPATIENT
Start: 2021-10-06 | End: 2021-10-07

## 2021-10-06 RX ORDER — ERYTHROMYCIN ETHYLSUCCINATE 400 MG
276 TABLET ORAL EVERY 8 HOURS
Refills: 0 | Status: DISCONTINUED | OUTPATIENT
Start: 2021-10-06 | End: 2021-10-07

## 2021-10-06 RX ORDER — PANTOPRAZOLE SODIUM 20 MG/1
40 TABLET, DELAYED RELEASE ORAL DAILY
Refills: 0 | Status: DISCONTINUED | OUTPATIENT
Start: 2021-10-06 | End: 2021-10-07

## 2021-10-06 RX ADMIN — OLANZAPINE 5 MILLIGRAM(S): 15 TABLET, FILM COATED ORAL at 23:00

## 2021-10-06 RX ADMIN — Medication 0.5 MILLIGRAM(S): at 01:09

## 2021-10-06 RX ADMIN — Medication 250 MILLIGRAM(S): at 15:18

## 2021-10-06 RX ADMIN — Medication 0.5 MILLIGRAM(S): at 14:14

## 2021-10-06 RX ADMIN — Medication 200 MILLIGRAM(S): at 06:06

## 2021-10-06 RX ADMIN — ATORVASTATIN CALCIUM 40 MILLIGRAM(S): 80 TABLET, FILM COATED ORAL at 23:02

## 2021-10-06 RX ADMIN — PANTOPRAZOLE SODIUM 40 MILLIGRAM(S): 20 TABLET, DELAYED RELEASE ORAL at 12:42

## 2021-10-06 RX ADMIN — Medication 0.5 MILLIGRAM(S): at 07:14

## 2021-10-06 RX ADMIN — ONDANSETRON 4 MILLIGRAM(S): 8 TABLET, FILM COATED ORAL at 04:28

## 2021-10-06 RX ADMIN — ONDANSETRON 4 MILLIGRAM(S): 8 TABLET, FILM COATED ORAL at 12:42

## 2021-10-06 RX ADMIN — GABAPENTIN 300 MILLIGRAM(S): 400 CAPSULE ORAL at 23:02

## 2021-10-06 NOTE — PROGRESS NOTE ADULT - PROBLEM SELECTOR PLAN 6
Pt reports history of cough variant asthma, takes montelukast at home.  -c/w home montelukast 10mg PO daily
Mikayla: s/p 1L NS

## 2021-10-06 NOTE — CONSULT NOTE ADULT - ASSESSMENT
50yo F with PMH of leah's disease, GERD with chronic nausea, chronic pancreatitis, diabetes, htn, obesity, presents from [facility] with intractable nausea after intrathecal pump implantation earlier today.     #Nausea/vomiting    Case discussed with [facility] attending and primary team.    Ondina Wolfe DO  Gastroenterology Fellow  Pager: [phone] 50yo F with PMH of leah's disease, GERD with chronic nausea, Hx of pancreatitis 2/2 hypertriglyceridemia (2005) and ERCP induced pancreatitis (2015), diabetes, htn, obesity, presents from [facility] with intractable nausea after intrathecal pump implantation earlier today.     #Nausea/vomiting  Patient presenting with acute onset nausea/vomiting s/p intrathecal pump procedure this past Monday 10/4. 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 Since then, patient has been following with pain management and now with increasing opioid requirements, started on Dilaudid 2 mg PO daily, now on Dilaudid 4 mg PO 1-3x daily for adequate pain control. Notes chronic nausea with intermittent episodes of vomiting for which she is on Zofran outpatient which adequately controls her symptoms. Now with acutely worsening nausea/vomiting since procedure.   -Abdominal XR withnNo abnormal bowel dilatation or pneumoperitoneum  -Would obtain CT head to further evaluate centrally mediated causes for nausea/vomiting post-procedurally  -States Ativan and Olanzapine has been helping her symptoms while inpatient however can consider Compazine 10 mg q8h PRN for nausea/vomiting to help with her symptoms.   -Avoid Reglan, reports history of tardive dyskinesia  -Can consider Zofran for control of nausea/vomiting as notes adequate control previously with medication, if requiring regularly, obtain daily EKGs to monitor QTC  -Remainder of supportive management as per primary team    #Abdominal Pain  Notes history of chronic pancreatitis. No previous abdominal imaging here for comparison however patient reports that she gets q6 month surveillance imaging of ?IPMN and ?pancreatic cysts, likely pseudocysts.   -Consider CT A/P with PO/IV contrast to further evaluate pain  -Obtain records of prior imaging from [facility]    Case discussed with [facility] attending and primary team.    Ondina Wolfe DO  Gastroenterology Fellow  Pager: [phone] 50yo F with PMH of leah's disease, GERD with chronic nausea, Hx of pancreatitis 2/2 hypertriglyceridemia (2005) and ERCP induced pancreatitis (2015), diabetes, htn, obesity, presents from [facility] with intractable nausea after intrathecal pump implantation earlier today. GI consulted for nausea/vomiting post placement of intra-thecal pump.    #Nausea/vomiting  Patient presenting with acute onset nausea/vomiting s/p intrathecal pump procedure (for pain control) this past Monday 10/4. 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 Since then, patient has been following with pain management and now with increasing opioid requirements, started on Dilaudid 2 mg PO daily, now on Dilaudid 4 mg PO 1-3x daily for adequate pain control. Notes chronic nausea with intermittent episodes of vomiting for which she is on Zofran outpatient which adequately controls her symptoms. Now with acutely worsening nausea/vomiting since procedure.   -Abdominal XR withnNo abnormal bowel dilatation or pneumoperitoneum  -Would obtain CT head to further evaluate centrally mediated causes for nausea/vomiting post-procedurally  -States Ativan and Olanzapine has been helping her symptoms while inpatient however can consider Compazine 10 mg q8h PRN for nausea/vomiting to help with her symptoms.   -Avoid Reglan, reports history of tardive dyskinesia  -Can consider Zofran for control of nausea/vomiting as notes adequate control previously with medication, if requiring regularly, obtain daily EKGs to monitor QTC  -Remainder of supportive management as per primary team    #Abdominal Pain  Notes history of chronic pancreatitis. No previous abdominal imaging here for comparison however patient reports that she gets q6 month surveillance imaging of ?IPMN and ?pancreatic cysts, likely pseudocysts.   -Consider CT A/P with PO/IV contrast to further evaluate pain  -Obtain records of prior imaging from [facility]    Case discussed with [facility] attending and primary team.    Ondina Wolfe DO  Gastroenterology Fellow  Pager: [phone]

## 2021-10-06 NOTE — CONSULT NOTE ADULT - SUBJECTIVE AND OBJECTIVE BOX
HPI:  50yo F with PMH of leah's disease, GERD with chronic nausea, chronic pancreatitis, diabetes, htn, obesity, presents from [facility] with intractable nausea after intrathecal pump implantation earlier today.  Vomited x1 NBNB. Says she also has chills. Per records, was given propofol and versed for procedure. Reports she has chronic abdominal pain but unchanged today. No fever. Takes zofran at home regularly and usually helps but today didn't. Reports all her doctors are at [facility] but they wouldn't transfer her there today. In the past she says someone has given her octreotide for nausea which helped. Patient denies h/d, chills, cp, palpitations, sob, leg swelling, rashes, dysuria, and changes in BM.     In the ED- VS were T 98, HR 93, /75, RR 16, sats 97% on RA   Labs significant for cbc/ bmp/ UA wnl  EKG showed NSR @ 94, , no ST changes/ TWI  Given Benadryl Pepcid solumedrol, Zofran and Protonix  (05 Oct 2021 06:30)    Allergies    amoxicillin (Unknown)  penicillin (Unknown)  Reglan (Unknown)  Topamax (Unknown)  Tylenol (Unknown)    Intolerances      MEDICATIONS:  MEDICATIONS  (STANDING):  atorvastatin 40 milliGRAM(s) Oral at bedtime  cholecalciferol 2000 Unit(s) Oral daily  dextrose 40% Gel 15 Gram(s) Oral once  dextrose 5%. 1000 milliLiter(s) (50 mL/Hr) IV Continuous <Continuous>  dextrose 5%. 1000 milliLiter(s) (100 mL/Hr) IV Continuous <Continuous>  dextrose 50% Injectable 25 Gram(s) IV Push once  dextrose 50% Injectable 12.5 Gram(s) IV Push once  dextrose 50% Injectable 25 Gram(s) IV Push once  enoxaparin Injectable 40 milliGRAM(s) SubCutaneous two times a day  erythromycin   IVPB 276 milliGRAM(s) IV Intermittent every 8 hours  gabapentin 300 milliGRAM(s) Oral three times a day  glucagon  Injectable 1 milliGRAM(s) IntraMuscular once  hydrocortisone 25 milliGRAM(s) Oral two times a day  influenza   Vaccine 0.5 milliLiter(s) IntraMuscular once  insulin lispro (ADMELOG) corrective regimen sliding scale   SubCutaneous three times a day before meals  montelukast 10 milliGRAM(s) Oral daily  pantoprazole  Injectable 40 milliGRAM(s) IV Push daily  predniSONE   Tablet 4 milliGRAM(s) Oral daily  sucralfate 1 Gram(s) Oral four times a day    MEDICATIONS  (PRN):  celecoxib 100 milliGRAM(s) Oral two times a day PRN Mild Pain (1 - 3)  cholestyramine Powder (Sugar-Free) 4 Gram(s) Oral four times a day PRN diarrhea  diclofenac 25 milliGRAM(s) Oral two times a day PRN Moderate Pain (4 - 6)  HYDROmorphone   Tablet 4 milliGRAM(s) Oral every 4 hours PRN Severe Pain (7 - 10)  LORazepam   Injectable 0.5 milliGRAM(s) IV Push every 6 hours PRN breakthrough nausea  OLANZapine Disintegrating Tablet 5 milliGRAM(s) Oral at bedtime PRN breakthrough nausea  ondansetron Injectable 4 milliGRAM(s) IV Push every 8 hours PRN Nausea and/or Vomiting  prochlorperazine   Injectable 10 milliGRAM(s) IntraMuscular every 8 hours PRN nausea/vomiting    PAST MEDICAL & SURGICAL HISTORY:  White&#x27;s disease    HTN (hypertension)    GERD (gastroesophageal reflux disease)    Chronic nausea    No significant past surgical history      FAMILY HISTORY:    SOCIAL HISTORY:  [place]ac[place]: [ ] Current, [ ] Former, [ ] Never; Pack Years:  Alcohol:  Illicit Drugs:    REVIEW OF SYSTEMS:  Constitutional: No fever, chills, weight loss, or fatigue  ENMT:  No visual changes; No difficulty hearing, tinnitus, vertigo; No sinus or throat pain  Neck: No pain or stiffness  Respiratory: No cough, wheezing, or hemoptysis; No shortness of breath  Cardiovascular: No chest pain, palpitations, dizziness, orthopnea, PND, or leg swelling  Gastrointestinal: No abdominal or epigastric pain. No  nausea, vomiting, or hematemesis. No diarrhea, constipation, or steatorrhea. No melena or hematochezia  Genitourinary: No dysuria, urinary frequency/hesitancy, or hematuria  Skin: No itching, burning, rashes or lesions   Musculoskeletal: No joint pain or swelling; No muscle, back or extremity pain    Vital Signs Last 24 Hrs  T(C): 36.7 (06 Oct 2021 17:08), Max: 36.8 (05 Oct 2021 20:32)  T(F): 98 (06 Oct 2021 17:08), Max: 98.3 (05 Oct 2021 20:32)  HR: 94 (06 Oct 2021 17:08) (87 - 101)  BP: 175/89 (06 Oct 2021 17:08) (115/69 - 175/89)  BP(mean): --  RR: 18 (06 Oct 2021 17:08) (16 - 18)  SpO2: 96% (06 Oct 2021 17:08) (96% - 99%)      PHYSICAL EXAM:    General: Well developed; well nourished; in no acute distress  HEENT: MMM, conjunctiva and sclera clear  Gastrointestinal: Soft, non-tender non-distended; Normal bowel sounds; No rebound or guarding  Extremities: Normal range of motion, No clubbing, cyanosis or edema  Neurological: Alert and oriented x3  Skin: Warm and dry. No obvious rash    LABS:                        13.3   8.81  )-----------( 316      ( 06 Oct 2021 06:30 )             40.4     10-06    136  |  104  |  18  ----------------------------<  147<H>  4.7   |  20<L>  |  0.66    Ca    9.5      06 Oct 2021 06:30  Phos  4.4     10-05  Mg     1.9     10-05    TPro  7.5  /  Alb  4.2  /  TBili  0.7  /  DBili  x   /  AST  17  /  ALT  53<H>  /  AlkPhos  90  10-04        RADIOLOGY & ADDITIONAL STUDIES:    HPI:  52yo F with PMH of david's disease, GERD with chronic nausea, chronic pancreatitis, diabetes, htn, obesity, presents from [facility] with intractable nausea after intrathecal pump implantation earlier today.  Vomited x1 NBNB. Says she also has chills. Per records, was given propofol and versed for procedure. Reports she has chronic abdominal pain but unchanged today. No fever. Takes zofran at home regularly and usually helps but today didn't. Reports all her doctors are at [facility] but they wouldn't transfer her there today. In the past she says someone has given her octreotide for nausea which helped. Patient denies h/d, chills, cp, palpitations, sob, leg swelling, rashes, dysuria, and changes in BM.     In the ED- VS were T 98, HR 93, /75, RR 16, sats 97% on RA   Labs significant for cbc/ bmp/ UA wnl  EKG showed NSR @ 94, , no ST changes/ TWI  Given Benadryl Pepcid solumedrol, Zofran and Protonix  (05 Oct 2021 06:30)    GI consulted for nausea/vomiting.     Allergies    amoxicillin (Unknown)  penicillin (Unknown)  Reglan (Unknown)  Topamax (Unknown)  Tylenol (Unknown)    Intolerances      MEDICATIONS:  MEDICATIONS  (STANDING):  atorvastatin 40 milliGRAM(s) Oral at bedtime  cholecalciferol 2000 Unit(s) Oral daily  dextrose 40% Gel 15 Gram(s) Oral once  dextrose 5%. 1000 milliLiter(s) (50 mL/Hr) IV Continuous <Continuous>  dextrose 5%. 1000 milliLiter(s) (100 mL/Hr) IV Continuous <Continuous>  dextrose 50% Injectable 25 Gram(s) IV Push once  dextrose 50% Injectable 12.5 Gram(s) IV Push once  dextrose 50% Injectable 25 Gram(s) IV Push once  enoxaparin Injectable 40 milliGRAM(s) SubCutaneous two times a day  erythromycin   IVPB 276 milliGRAM(s) IV Intermittent every 8 hours  gabapentin 300 milliGRAM(s) Oral three times a day  glucagon  Injectable 1 milliGRAM(s) IntraMuscular once  hydrocortisone 25 milliGRAM(s) Oral two times a day  influenza   Vaccine 0.5 milliLiter(s) IntraMuscular once  insulin lispro (ADMELOG) corrective regimen sliding scale   SubCutaneous three times a day before meals  montelukast 10 milliGRAM(s) Oral daily  pantoprazole  Injectable 40 milliGRAM(s) IV Push daily  predniSONE   Tablet 4 milliGRAM(s) Oral daily  sucralfate 1 Gram(s) Oral four times a day    MEDICATIONS  (PRN):  celecoxib 100 milliGRAM(s) Oral two times a day PRN Mild Pain (1 - 3)  cholestyramine Powder (Sugar-Free) 4 Gram(s) Oral four times a day PRN diarrhea  diclofenac 25 milliGRAM(s) Oral two times a day PRN Moderate Pain (4 - 6)  HYDROmorphone   Tablet 4 milliGRAM(s) Oral every 4 hours PRN Severe Pain (7 - 10)  LORazepam   Injectable 0.5 milliGRAM(s) IV Push every 6 hours PRN breakthrough nausea  OLANZapine Disintegrating Tablet 5 milliGRAM(s) Oral at bedtime PRN breakthrough nausea  ondansetron Injectable 4 milliGRAM(s) IV Push every 8 hours PRN Nausea and/or Vomiting  prochlorperazine   Injectable 10 milliGRAM(s) IntraMuscular every 8 hours PRN nausea/vomiting    PAST MEDICAL & SURGICAL HISTORY:  David&#x27;s disease    HTN (hypertension)    GERD (gastroesophageal reflux disease)    Chronic nausea    No significant past surgical history      FAMILY HISTORY:    SOCIAL HISTORY:  [place]: [ ] Current, [ ] Former, [ ] Never; Pack Years:  Alcohol:  Illicit Drugs:    REVIEW OF SYSTEMS:  Constitutional: No fever, chills, weight loss, or fatigue  ENMT:  No visual changes; No difficulty hearing, tinnitus, vertigo; No sinus or throat pain  Neck: No pain or stiffness  Respiratory: No cough, wheezing, or hemoptysis; No shortness of breath  Cardiovascular: No chest pain, palpitations, dizziness, orthopnea, PND, or leg swelling  Gastrointestinal: No abdominal or epigastric pain. No  nausea, vomiting, or hematemesis. No diarrhea, constipation, or steatorrhea. No melena or hematochezia  Genitourinary: No dysuria, urinary frequency/hesitancy, or hematuria  Skin: No itching, burning, rashes or lesions   Musculoskeletal: No joint pain or swelling; No muscle, back or extremity pain    Vital Signs Last 24 Hrs  T(C): 36.7 (06 Oct 2021 17:08), Max: 36.8 (05 Oct 2021 20:32)  T(F): 98 (06 Oct 2021 17:08), Max: 98.3 (05 Oct 2021 20:32)  HR: 94 (06 Oct 2021 17:08) (87 - 101)  BP: 175/89 (06 Oct 2021 17:08) (115/69 - 175/89)  BP(mean): --  RR: 18 (06 Oct 2021 17:08) (16 - 18)  SpO2: 96% (06 Oct 2021 17:08) (96% - 99%)      PHYSICAL EXAM:    Constitutional: obese female sitting up in bed, leaning forward  HEENT: PERRL, EOMI, sclera anicteric, neck supple, MMM  Back: tenderness to palpation overlying site of intrathecal pump  Gastrointestinal: obese, tenderness to palpation localized to epigastric region  Extremities: Warm and well perfused; 2+ pulses equal bilateral upper and lower extremities; no edema, cyanosis, or clubbing  Neurological: AOx3, CN II-XII grossly intact, no focal deficits     LABS:                        13.3   8.81  )-----------( 316      ( 06 Oct 2021 06:30 )             40.4     10-06    136  |  104  |  18  ----------------------------<  147<H>  4.7   |  20<L>  |  0.66    Ca    9.5      06 Oct 2021 06:30  Phos  4.4     10-05  Mg     1.9     10-05    TPro  7.5  /  Alb  4.2  /  TBili  0.7  /  DBili  x   /  AST  17  /  ALT  53<H>  /  AlkPhos  90  10-04        RADIOLOGY & ADDITIONAL STUDIES:    HPI:  50yo F with PMH of david's disease, GERD with chronic nausea, chronic pancreatitis, diabetes, htn, obesity, presents from [facility] with intractable nausea after intrathecal pump implantation earlier today.  Vomited x1 NBNB. Says she also has chills. Per records, was given propofol and versed for procedure. Reports she has chronic abdominal pain but unchanged today. No fever. Takes zofran at home regularly and usually helps but today didn't. Reports all her doctors are at [facility] but they wouldn't transfer her there today. In the past she says someone has given her octreotide for nausea which helped. Patient denies h/d, chills, cp, palpitations, sob, leg swelling, rashes, dysuria, and changes in BM.     In the ED- VS were T 98, HR 93, /75, RR 16, sats 97% on RA   Labs significant for cbc/ bmp/ UA wnl  EKG showed NSR @ 94, , no ST changes/ TWI  Given Benadryl Pepcid solumedrol, Zofran and Protonix  (05 Oct 2021 06:30)    GI consulted for nausea/vomiting.     Allergies    amoxicillin (Unknown)  penicillin (Unknown)  Reglan (Unknown)  Topamax (Unknown)  Tylenol (Unknown)    Intolerances      MEDICATIONS:  MEDICATIONS  (STANDING):  atorvastatin 40 milliGRAM(s) Oral at bedtime  cholecalciferol 2000 Unit(s) Oral daily  dextrose 40% Gel 15 Gram(s) Oral once  dextrose 5%. 1000 milliLiter(s) (50 mL/Hr) IV Continuous <Continuous>  dextrose 5%. 1000 milliLiter(s) (100 mL/Hr) IV Continuous <Continuous>  dextrose 50% Injectable 25 Gram(s) IV Push once  dextrose 50% Injectable 12.5 Gram(s) IV Push once  dextrose 50% Injectable 25 Gram(s) IV Push once  enoxaparin Injectable 40 milliGRAM(s) SubCutaneous two times a day  erythromycin   IVPB 276 milliGRAM(s) IV Intermittent every 8 hours  gabapentin 300 milliGRAM(s) Oral three times a day  glucagon  Injectable 1 milliGRAM(s) IntraMuscular once  hydrocortisone 25 milliGRAM(s) Oral two times a day  influenza   Vaccine 0.5 milliLiter(s) IntraMuscular once  insulin lispro (ADMELOG) corrective regimen sliding scale   SubCutaneous three times a day before meals  montelukast 10 milliGRAM(s) Oral daily  pantoprazole  Injectable 40 milliGRAM(s) IV Push daily  predniSONE   Tablet 4 milliGRAM(s) Oral daily  sucralfate 1 Gram(s) Oral four times a day    MEDICATIONS  (PRN):  celecoxib 100 milliGRAM(s) Oral two times a day PRN Mild Pain (1 - 3)  cholestyramine Powder (Sugar-Free) 4 Gram(s) Oral four times a day PRN diarrhea  diclofenac 25 milliGRAM(s) Oral two times a day PRN Moderate Pain (4 - 6)  HYDROmorphone   Tablet 4 milliGRAM(s) Oral every 4 hours PRN Severe Pain (7 - 10)  LORazepam   Injectable 0.5 milliGRAM(s) IV Push every 6 hours PRN breakthrough nausea  OLANZapine Disintegrating Tablet 5 milliGRAM(s) Oral at bedtime PRN breakthrough nausea  ondansetron Injectable 4 milliGRAM(s) IV Push every 8 hours PRN Nausea and/or Vomiting  prochlorperazine   Injectable 10 milliGRAM(s) IntraMuscular every 8 hours PRN nausea/vomiting    PAST MEDICAL & SURGICAL HISTORY:  David&#x27;s disease    HTN (hypertension)    GERD (gastroesophageal reflux disease)    Chronic nausea    No significant past surgical history      FAMILY HISTORY:    SOCIAL HISTORY:  [place]ac[place]: [ ] Current, [ ] Former, [ ] Never; Pack Years:  Alcohol:  Illicit Drugs:    REVIEW OF SYSTEMS:  Constitutional: No fever, chills, weight loss, or fatigue  ENMT:  No visual changes; No difficulty hearing, tinnitus, vertigo; No sinus or throat pain  Neck: No pain or stiffness  Respiratory: No cough, wheezing, or hemoptysis; No shortness of breath  Cardiovascular: No chest pain, palpitations, dizziness, orthopnea, PND, or leg swelling  Gastrointestinal: No abdominal or epigastric pain. No  nausea, vomiting, or hematemesis. No diarrhea, constipation, or steatorrhea. No melena or hematochezia  Genitourinary: No dysuria, urinary frequency/hesitancy, or hematuria  Skin: No itching, burning, rashes or lesions   Musculoskeletal: No joint pain or swelling; No muscle, back or extremity pain    Vital Signs Last 24 Hrs  T(C): 36.7 (06 Oct 2021 17:08), Max: 36.8 (05 Oct 2021 20:32)  T(F): 98 (06 Oct 2021 17:08), Max: 98.3 (05 Oct 2021 20:32)  HR: 94 (06 Oct 2021 17:08) (87 - 101)  BP: 175/89 (06 Oct 2021 17:08) (115/69 - 175/89)  BP(mean): --  RR: 18 (06 Oct 2021 17:08) (16 - 18)  SpO2: 96% (06 Oct 2021 17:08) (96% - 99%)      PHYSICAL EXAM:    Constitutional: obese  female sitting up in bed, leaning forward  HEENT: PERRL, EOMI, sclera anicteric, neck supple, MMM  Back: tenderness to palpation overlying site of intrathecal pump  Gastrointestinal: obese, tenderness to palpation localized to epigastric region  Extremities: Warm and well perfused; 2+ pulses equal bilateral upper and lower extremities; no edema, cyanosis, or clubbing  Neurological: AOx3, CN II-XII grossly intact, no focal deficits     LABS:                        13.3   8.81  )-----------( 316      ( 06 Oct 2021 06:30 )             40.4     10-06    136  |  104  |  18  ----------------------------<  147<H>  4.7   |  20<L>  |  0.66    Ca    9.5      06 Oct 2021 06:30  Phos  4.4     10-05  Mg     1.9     10-05    TPro  7.5  /  Alb  4.2  /  TBili  0.7  /  DBili  x   /  AST  17  /  ALT  53<H>  /  AlkPhos  90  10-04        RADIOLOGY & ADDITIONAL STUDIES:

## 2021-10-06 NOTE — PROGRESS NOTE ADULT - PROBLEM SELECTOR PROBLEM 7
Called to follow-up, no answer received, LVM requesting call back. TCB    
Patient requesting call Re;discomfort on Right side---  
Pt reports mid chest pain that radiates down right breast and through to back right shoulder blade. Pt reports intermittent SOB. Pt declines ER and ambulance transport at this time.     Pt reports will go to urgent care at either [place] or [place]. Websites display both sites open until 8pm.       
Prophylactic measure

## 2021-10-06 NOTE — PROGRESS NOTE ADULT - SUBJECTIVE AND OBJECTIVE BOX
INTERVAL HPI/OVERNIGHT EVENTS:  Patient was seen and examined at bedside.   No acute o/n events, pt requested sublingual zyprexa and got ativan x1, zofran x1, and tigan x1 for nausea. This morning pt's nausea is the worst it's been, pt actively vomiting into bucket at bedside and apologetic she is feeling badly, vomitus is clear and brown without blood, food particles, or coffee ground particles. Abd pain is present, hasn't gotten any pain meds bc they make her more nauseous, but nausea is currently worse. No diarrhea. Patient denies: fever, chills, dizziness, weakness, HA, Changes in vision, CP, palpitations, SOB, cough, dysuria, LE edema. ROS otherwise negative.        VITAL SIGNS:  T(F): 98 (10-06-21 @ 10:48)  HR: 87 (10-06-21 @ 10:48)  BP: 125/85 (10-06-21 @ 10:48)  RR: 18 (10-06-21 @ 10:48)  SpO2: 99% (10-06-21 @ 10:48)  Wt(kg): --    PHYSICAL EXAM:    Constitutional: obese female sitting up in bed, intermittently vomiting into bucket during exam, speaking in full sentences in between.   HEENT: PERRL, EOMI, sclera anicteric, neck supple, MMM, good dentition  Respiratory: CTA b/l anteriorly; no wheezing, no rhonchi, no rales; unlabored respirations  Cardiovascular: RRR, normal S1, S2; no M/R/G  Gastrointestinal: obese, BS hypoactive in upper quadrants, manual abdominal exam deferred given severe nausea/vomiting at this time  Extremities: Warm and well perfused; 2+ pulses equal bilateral upper and lower extremities; no edema, cyanosis, or clubbing  Neurological: AOx3, CN II-XII grossly intact, no focal deficits   Skin: Normal temperature, warm, dry; no rashes or lesions noted    MEDICATIONS  (STANDING):  atorvastatin 40 milliGRAM(s) Oral at bedtime  cholecalciferol 2000 Unit(s) Oral daily  dextrose 40% Gel 15 Gram(s) Oral once  dextrose 5%. 1000 milliLiter(s) (50 mL/Hr) IV Continuous <Continuous>  dextrose 5%. 1000 milliLiter(s) (100 mL/Hr) IV Continuous <Continuous>  dextrose 50% Injectable 25 Gram(s) IV Push once  dextrose 50% Injectable 12.5 Gram(s) IV Push once  dextrose 50% Injectable 25 Gram(s) IV Push once  enoxaparin Injectable 40 milliGRAM(s) SubCutaneous two times a day  famotidine    Tablet 20 milliGRAM(s) Oral two times a day  gabapentin 300 milliGRAM(s) Oral three times a day  glucagon  Injectable 1 milliGRAM(s) IntraMuscular once  hydrocortisone 25 milliGRAM(s) Oral two times a day  influenza   Vaccine 0.5 milliLiter(s) IntraMuscular once  insulin lispro (ADMELOG) corrective regimen sliding scale   SubCutaneous three times a day before meals  montelukast 10 milliGRAM(s) Oral daily  predniSONE   Tablet 4 milliGRAM(s) Oral daily  sucralfate 1 Gram(s) Oral four times a day    MEDICATIONS  (PRN):  celecoxib 100 milliGRAM(s) Oral two times a day PRN Mild Pain (1 - 3)  cholestyramine Powder (Sugar-Free) 4 Gram(s) Oral four times a day PRN diarrhea  diclofenac 25 milliGRAM(s) Oral two times a day PRN Moderate Pain (4 - 6)  HYDROmorphone   Tablet 4 milliGRAM(s) Oral every 4 hours PRN Severe Pain (7 - 10)  LORazepam   Injectable 0.5 milliGRAM(s) IV Push every 6 hours PRN breakthrough nausea  ondansetron Injectable 4 milliGRAM(s) IV Push every 8 hours PRN Nausea and/or Vomiting  trimethobenzamide Injectable 200 milliGRAM(s) IntraMuscular every 8 hours PRN Nausea and/or Vomiting      Allergies    amoxicillin (Unknown)  erythromycin (Unknown)  penicillin (Unknown)  Reglan (Unknown)  Topamax (Unknown)  Tylenol (Unknown)    Intolerances        LABS:                        13.3   8.81  )-----------( 316      ( 06 Oct 2021 06:30 )             40.4     10-06    136  |  104  |  18  ----------------------------<  147<H>  4.7   |  20<L>  |  0.66    Ca    9.5      06 Oct 2021 06:30  Phos  4.4     10-05  Mg     1.9     10-05    TPro  7.5  /  Alb  4.2  /  TBili  0.7  /  DBili  x   /  AST  17  /  ALT  53<H>  /  AlkPhos  90  10-      Urinalysis Basic - ( 05 Oct 2021 01:09 )    Color: Yellow / Appearance: SL Cloudy / S.025 / pH: x  Gluc: x / Ketone: 40 mg/dL  / Bili: Negative / Urobili: 0.2 E.U./dL   Blood: x / Protein: NEGATIVE mg/dL / Nitrite: NEGATIVE   Leuk Esterase: NEGATIVE / RBC: x / WBC x   Sq Epi: x / Non Sq Epi: x / Bacteria: x      CAPILLARY BLOOD GLUCOSE      POCT Blood Glucose.: 136 mg/dL (06 Oct 2021 07:25)  POCT Blood Glucose.: 136 mg/dL (05 Oct 2021 21:48)  POCT Blood Glucose.: 124 mg/dL (05 Oct 2021 17:48)  POCT Blood Glucose.: 131 mg/dL (05 Oct 2021 12:48)    RADIOLOGY:  Abd XR (10/5): pending official read

## 2021-10-06 NOTE — CONSULT NOTE ADULT - ATTENDING COMMENTS
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      Patient with headaches and ongoing abdominal pain. Would ensure there is no ICP hypertension. For abdominal pain, would also consider cross-sectional abdominal imaging. Symptoms may be exacerbated by recent anesthesia exposure. No e/o ileus. If persistent symptoms could consider EGD to rule out GOO.    - non-con head CT  - CTAP with IV and PO contrast  - AM cortisol   - ensure daily BM - daily Miralax  - pain control per pain management/primary team   - trial compazine 10mg PRN  - if no improvement, lorazepam   - if no improvement trial olanzapine   - if no improvement consider combination therapy as before  - obtain collateral information from [facility]   - bowel rest, slowly advance diet when ready, start with clears    Zachary Fleming MD  GI Attending

## 2021-10-06 NOTE — PROGRESS NOTE ADULT - PROBLEM SELECTOR PLAN 5
Pt with hx of GERD   - c/w Protonix 40 mg daily  - c/w Carafate 1g PO QID
Plan: Pt with hx of GERD   - c/w Protonix 40 mg daily  - c/w Carafate 1g QID

## 2021-10-06 NOTE — PROGRESS NOTE ADULT - PROBLEM SELECTOR PLAN 3
Pt with hx of addisons disease, on steroids at home  - c/w solucortef 50mg IV while unable to tolerate PO   - once able to tolerate PO: c/w home prednisone 4 mg daily and hydrocortisone 25 mg BID

## 2021-10-06 NOTE — PROGRESS NOTE ADULT - PROBLEM SELECTOR PLAN 4
Pt with hx of diabetes 2/2 chronic pancreatitis. HbA1c 6.3. Home PO meds: Invokana 300 mg daily, metformin 1000 mg BID when able to tolerate oral    - holding home PO Invokana 300 mg daily, metformin 1000 mg BID while inpatient  - c/w insulin lispro sliding scale sq premeal TID   - consider consult Endo for insulin pump management (Humalog 100 units/ml)

## 2021-10-06 NOTE — PROGRESS NOTE ADULT - PROBLEM SELECTOR PLAN 7
F: none, consider additional IVF if dry on exam/labs   E: replete as necessary, maintain K>4 and Mg>2  N: consistent carbs    VTE PPx: lovenox 40mg SQ q12h  GI PPx: pantoprazole    Code status: FULL CODE    Disposition: RMF, pending resolution of nausea/vomiting and PO tolerance    1) PCP Contacted on Admission: (Y/N) --> Name & Phone #:  2) Date of Contact with PCP:  3) PCP Contacted at Discharge: (Y/N)  4) Summary of Handoff Given to PCP:   5) Post-Discharge Appointment Date and Location:
52

## 2021-10-06 NOTE — PROGRESS NOTE ADULT - PROBLEM SELECTOR PLAN 1
Pt with hx of chronic nausea, presented with severe intractable nausea post intrathecal pump placement with one episode of NBNB vomiting. Likely 2/2 chronic nausea exacerbation vs. intestinal obstruction. CSF leak or anesthesia side effect unlikely. Pt minimally improved. Pt unable to tolerate PO at this time  - Pump discontinued  - c/w Zolfran 4mg IV q8h PRN nausea/vomiting  - c/w Tigan 200mg IV q8h PRN nausea/vomiting   - c/w Ativan 0.5mg IV q6h PRN breakthrough nausea/vomiting   - trial of erythromycin 275mg (3mg/kg) IVPB for nausea   - GI consult for intractable nausea/vomiting    - f/u upright abd xray

## 2021-10-06 NOTE — PROGRESS NOTE ADULT - PROBLEM SELECTOR PLAN 2
Pt with chronic abdominal pain due to chronic pancreatitis and gastritis. Currently experiencing abdominal pain. Intrathecal pump d/c due to nausea and pt unable to tolerate PO opiates  - c/w home gabapentin 300mg PO TID (as tolerated), pantoprazole 40mg IV daily (therapeutic interchange for home nexium)  - c/w home celecoxib 100mg PO BID PRN mild pain and diclofenac 25mg PO BID PRN moderate pain  - f/u pain management consulted yesterday (10/5) for recs on opioid regimen and when to restart intrathecal pump

## 2021-10-06 NOTE — PROGRESS NOTE ADULT - ASSESSMENT
Patient is a 51y old Female w/ PMH of [place] disease, GERD/Gastritis with chronic nausea, chronic pancreatitis with chronic abdominal pain, mixed familial hyperlipidemia, diabetes, htn, and obesity who presented with a chief complaint of nausea and vomiting following intrathecal pain pump implantation. Still complaining of severe nausea with minimal improvement and inability to tolerate PO. Patient also experiencing her typical chronic abdominal pain, as intrathecal pump turned off due to nausea and pt unable to tolerate PO hydromorphone. Pt also constipated with last defecation on 10/3. PE shows hypoactive bowel sounds with tenderness to palpation in the upper quadrants and epigastric area.

## 2021-10-07 ENCOUNTER — TRANSCRIPTION ENCOUNTER (OUTPATIENT)
Age: 51
End: 2021-10-07

## 2021-10-07 VITALS
SYSTOLIC BLOOD PRESSURE: 145 MMHG | RESPIRATION RATE: 18 BRPM | DIASTOLIC BLOOD PRESSURE: 80 MMHG | HEART RATE: 90 BPM | OXYGEN SATURATION: 98 % | TEMPERATURE: 98 F

## 2021-10-07 DIAGNOSIS — R11.2 NAUSEA WITH VOMITING, UNSPECIFIED: ICD-10-CM

## 2021-10-07 LAB
ANION GAP SERPL CALC-SCNC: 12 MMOL/L — SIGNIFICANT CHANGE UP (ref 5–17)
BUN SERPL-MCNC: 13 MG/DL — SIGNIFICANT CHANGE UP (ref 7–23)
CALCIUM SERPL-MCNC: 9.6 MG/DL — SIGNIFICANT CHANGE UP (ref 8.4–10.5)
CHLORIDE SERPL-SCNC: 102 MMOL/L — SIGNIFICANT CHANGE UP (ref 96–108)
CO2 SERPL-SCNC: 24 MMOL/L — SIGNIFICANT CHANGE UP (ref 22–31)
CREAT SERPL-MCNC: 0.68 MG/DL — SIGNIFICANT CHANGE UP (ref 0.5–1.3)
GLUCOSE BLDC GLUCOMTR-MCNC: 139 MG/DL — HIGH (ref 70–99)
GLUCOSE SERPL-MCNC: 158 MG/DL — HIGH (ref 70–99)
HCT VFR BLD CALC: 41.5 % — SIGNIFICANT CHANGE UP (ref 34.5–45)
HGB BLD-MCNC: 13.3 G/DL — SIGNIFICANT CHANGE UP (ref 11.5–15.5)
MAGNESIUM SERPL-MCNC: 1.9 MG/DL — SIGNIFICANT CHANGE UP (ref 1.6–2.6)
MCHC RBC-ENTMCNC: 29.5 PG — SIGNIFICANT CHANGE UP (ref 27–34)
MCHC RBC-ENTMCNC: 32 GM/DL — SIGNIFICANT CHANGE UP (ref 32–36)
MCV RBC AUTO: 92 FL — SIGNIFICANT CHANGE UP (ref 80–100)
NRBC # BLD: 0 /100 WBCS — SIGNIFICANT CHANGE UP (ref 0–0)
PHOSPHATE SERPL-MCNC: 3.3 MG/DL — SIGNIFICANT CHANGE UP (ref 2.5–4.5)
PLATELET # BLD AUTO: 308 K/UL — SIGNIFICANT CHANGE UP (ref 150–400)
POTASSIUM SERPL-MCNC: 4 MMOL/L — SIGNIFICANT CHANGE UP (ref 3.5–5.3)
POTASSIUM SERPL-SCNC: 4 MMOL/L — SIGNIFICANT CHANGE UP (ref 3.5–5.3)
RBC # BLD: 4.51 M/UL — SIGNIFICANT CHANGE UP (ref 3.8–5.2)
RBC # FLD: 13.5 % — SIGNIFICANT CHANGE UP (ref 10.3–14.5)
SODIUM SERPL-SCNC: 138 MMOL/L — SIGNIFICANT CHANGE UP (ref 135–145)
WBC # BLD: 8.39 K/UL — SIGNIFICANT CHANGE UP (ref 3.8–10.5)
WBC # FLD AUTO: 8.39 K/UL — SIGNIFICANT CHANGE UP (ref 3.8–10.5)

## 2021-10-07 PROCEDURE — 99239 HOSP IP/OBS DSCHRG MGMT >30: CPT

## 2021-10-07 RX ORDER — LOPERAMIDE HCL 2 MG
1 TABLET ORAL
Qty: 0 | Refills: 0 | DISCHARGE

## 2021-10-07 RX ORDER — PROCHLORPERAZINE MALEATE 5 MG
2 TABLET ORAL
Qty: 42 | Refills: 0
Start: 2021-10-07 | End: 2021-10-13

## 2021-10-07 RX ORDER — DIPHENOXYLATE HCL/ATROPINE 2.5-.025MG
2 TABLET ORAL
Qty: 0 | Refills: 0 | DISCHARGE

## 2021-10-07 RX ORDER — ERYTHROMYCIN ETHYLSUCCINATE 400 MG
2 TABLET ORAL
Qty: 60 | Refills: 0
Start: 2021-10-07 | End: 2021-10-16

## 2021-10-07 RX ORDER — ERYTHROMYCIN ETHYLSUCCINATE 400 MG
500 TABLET ORAL EVERY 8 HOURS
Refills: 0 | Status: DISCONTINUED | OUTPATIENT
Start: 2021-10-07 | End: 2021-10-07

## 2021-10-07 RX ORDER — HYDROCORTISONE 20 MG
50 TABLET ORAL
Qty: 0 | Refills: 0 | DISCHARGE

## 2021-10-07 RX ORDER — PROCHLORPERAZINE MALEATE 5 MG
10 TABLET ORAL EVERY 8 HOURS
Refills: 0 | Status: DISCONTINUED | OUTPATIENT
Start: 2021-10-07 | End: 2021-10-07

## 2021-10-07 RX ADMIN — Medication 0.5 MILLIGRAM(S): at 09:01

## 2021-10-07 RX ADMIN — Medication 10 MILLIGRAM(S): at 05:20

## 2021-10-07 RX ADMIN — Medication 250 MILLIGRAM(S): at 01:05

## 2021-10-07 RX ADMIN — Medication 4 MILLIGRAM(S): at 08:51

## 2021-10-07 RX ADMIN — ONDANSETRON 4 MILLIGRAM(S): 8 TABLET, FILM COATED ORAL at 05:19

## 2021-10-07 RX ADMIN — Medication 25 MILLIGRAM(S): at 08:51

## 2021-10-07 RX ADMIN — Medication 1 GRAM(S): at 00:00

## 2021-10-07 NOTE — DISCHARGE NOTE PROVIDER - NSDCFUADDAPPT_GEN_ALL_CORE_FT
Please follow up with your GI specialist, Dr. Mccarthy, within 2 weeks of discharge.     Please also make a follow up appointment with your pain specialist, Dr. Torrez, regarding how to use your pain pump.     We also recommend you follow up with your PCP as usual.

## 2021-10-07 NOTE — DISCHARGE NOTE PROVIDER - HOSPITAL COURSE
#Discharge: do not delete    Patient is 52 yo F with past medical history of David disease, GERD/Gastroparesis with chronic nausea, Chronic pancreatitis with chronic abdominal pain, Diabetes, HTN, Obesity, presented with intractable nausea and vomiting following intrathecal pump placement at [facility] course (by problem):   1. Nausea and vomiting  Plan: Pt with chronic nausea presented with intractable nausea and vomiting following intrathecal pump placement with inability to tolerate PO.   Minimal improvement with Zolfran, Zyprexa, Ativan, and Tigan. Improved significantly on IV Erythromycin 275 mg, with relief of nausea, vomiting, and ability to tolerate PO on 10/7.   - D/c on PO Erythromycin 500 mg TID for 10 days   - f/u with GI     2. Chronic Abdominal Pain   Plan: Pt with chronic abdominal pain 2/2 chronic pancreatitis and gastritis  - c/w Gabapentin 300 mg, Voltaren   - f/u with Pain Management to determine when to restart intrathecal pain pump       Patient was discharged to: (home/[facility]/acute rehab/hospice, etc, and with what services – home health PT/RN? Home O2?)  Home with f/u GI appt     New medications:   Erythromycin  mg TID for 10 days     Changes to old medications:  Medications that were stopped:    Items to follow up as outpatient:    Physical exam at the time of discharge:       #Discharge: do not delete    Patient is 52 yo F with past medical history of David disease, GERD/Gastroparesis with chronic nausea, chronic pancreatitis with chronic abdominal pain, diabetes, HTN, obesity, presented with intractable nausea and vomiting following intrathecal pump placement at [facility] course (by problem):   1. Nausea and vomiting  Pt with chronic nausea presented with intractable nausea and non-bloody vomiting following intrathecal pump placement with inability to tolerate PO.   Minimal improvement with Zolfran, Zyprexa, Ativan, and Tigan. Improved significantly on IV Erythromycin 275 mg with relief of nausea, vomiting, and ability to tolerate PO on 10/7. Differential included Gastroparesis vs. exacerbation of chronic nausea vs. bowel obstruction vs CSF leak. Gastroparesis most likely given Hx and improvement with Erythromycin     - Abdominal XR with no abnormal bowel dilation or pneumoperitoneum   - CT Abdomen with mild non specific changes to SI 2/2 to viral/other infectious enteritis vs adynamic ileus   - CT Head with no evidence of CSF leak     Plan:   - D/c on PO Erythromycin 500 mg TID for 10 days, PO Zofran 8 mg TID, and Compazine   - f/u with GI     2. Chronic Abdominal Pain   Plan: Pt with chronic abdominal pain 2/2 chronic pancreatitis and gastritis. Pain began in 2005 following episode of severe pancreatitis. Intermittently on opiates for pain management. 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     Plan:   - c/w Home Pain Regimen: Gabapentin 300 mg, Voltaren   - f/u with Pain Management     3. David Disease  Pt with hx of David disease treated with steroids and home    Plan:  - C/w Prednisone 4 mg daily  - C/w Hydrocortisone 15 mg BID     4. GERD  Pt with chronic GERD    Plan:  - C/w Protonix 40 mg daily  - C/w Carafate 1g QID       Patient was discharged to home   Home with f/u GI appt and Pain Management appt     New medications:   Erythromycin  mg TID for 10 days   Compazine     Changes to old medications:  Medications that were stopped:    Items to follow up as outpatient:    Physical exam at the time of discharge:       #Discharge: do not delete    Patient is 52 yo F with past medical history of David disease, GERD/Gastroparesis with chronic nausea, chronic pancreatitis with chronic abdominal pain, diabetes, HTN, obesity, presented with intractable nausea and vomiting following intrathecal pump placement at [facility] course (by problem):   1. Nausea and vomiting  Pt with chronic nausea presented with intractable nausea and non-bloody vomiting with inability to tolerate PO following intrathecal pump placement.   Minimal improvement with Zolfran, Zyprexa, Ativan, and Tigan. Improved significantly on IV Erythromycin 275 mg with relief of nausea, vomiting, and ability to tolerate PO on 10/7. Differential included Gastroparesis vs. exacerbation of chronic nausea vs. bowel obstruction vs CSF leak. Gastroparesis most likely given Hx and improvement with Erythromycin     - Abdominal XR with no abnormal bowel dilation or pneumoperitoneum   - CT Abdomen with mild non specific changes to SI 2/2 to viral/other infectious enteritis vs adynamic ileus   - CT Head with no evidence of CSF leak     Plan:   - D/c on PO Erythromycin 500 mg TID for 10 days, PO Zofran 8 mg TID, and Compazine   - f/u with GI outpt     2. Chronic Abdominal Pain   Pt with chronic abdominal pain 2/2 chronic pancreatitis and gastritis. Pain began in 2005 following episode of severe pancreatitis. Intermittently on opiates for pain management. 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     Plan:   - c/w Home Pain Regimen: Gabapentin 300 mg, Voltaren   - f/u with Pain Management to discuss medications and intrathecal pump placement     3. David Disease  Pt with hx of [place] disease treated with steroids and home    Plan:  - C/w Prednisone 4 mg daily  - C/w Hydrocortisone 20 mg BID     4. GERD  Pt with chronic GERD    Plan:  - C/w Home meds: Protonix 40 mg daily, Carafate 1g QID     5. Diabetes  Pt with hx of Diabetes    Plan:  - C/w Home meds: Humalog 100 units/mL, Metformin 1000 mg BID, Invokana 300 mg once daily, Ozempic 2mg/1.5ml       Patient was discharged to home   Home with f/u GI appt and Pain Management appt     New medications:   Erythromycin  mg TID for 10 days   Compazine     Changes to old medications:  Medications that were stopped:    Items to follow up as outpatient:    Physical exam at the time of discharge:       #Discharge: do not delete    Patient is 50 yo F with past medical history of David disease, GERD/Gastroparesis with chronic nausea, chronic pancreatitis with chronic abdominal pain, diabetes, HTN, obesity, presented with intractable nausea and vomiting following intrathecal pump placement at [facility]    Hospital course (by problem):     #Nausea and vomiting  Pt with chronic nausea presented with intractable nausea and non-bloody vomiting with inability to tolerate PO following intrathecal pump placement.   Minimal improvement with Zolfran, Zyprexa, Ativan, and Tigan. Improved significantly on IV Erythromycin 275 mg with relief of nausea, vomiting, and ability to tolerate PO on 10/7. Differential included Gastroparesis vs. exacerbation of chronic nausea vs. bowel obstruction vs CSF leak. Gastroparesis most likely given Hx and improvement with Erythromycin    - Abdominal XR with no abnormal bowel dilation or pneumoperitoneum   - CT Abdomen with mild non specific changes to small intestine 2/2 to viral/other infectious enteritis vs adynamic ileus   - CT Head with no evidence of CSF leak   - D/c on PO Erythromycin 500 mg TID for 10 days, PO Zofran 8 mg TID, and Compazine   - f/u with GI outpt      #Chronic Abdominal Pain   Pt with chronic abdominal pain 2/2 chronic pancreatitis and gastritis. Pain began in 2005 following episode of severe pancreatitis. Intermittently on opiates for pain management. 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   - c/w Home Pain Regimen: Gabapentin 300 mg, Voltaren   - f/u with Pain Management to discuss medications and intrathecal pump placement     #David Disease  Pt with hx of David disease treated with steroids at home  - C/w Prednisone 4 mg daily  - C/w Hydrocortisone 20 mg BID     #GERD  Pt with chronic GERD. Takes protonix and carafate at home  - C/w Home meds: Protonix 40 mg daily, Carafate 1g QID     #Diabetes  Pt with hx of Diabetes  -managed with ISS while inpatient  - C/w Home meds: Humalog 100 units/mL, Metformin 1000 mg BID, Invokana 300 mg once daily, Ozempic 2mg/1.5ml     Patient was discharged to: home     New medications:   -Erythromycin 500 mg PO TID PRN nausea/vomiting for 10 days   -Compazine 10mg PO TID PRN nausea/vomiting     Changes to old medications: none  Medications that were stopped: none    Items to follow up as outpatient: GI followup appointment, pain management followup appointment with Dr. Torrez for pain pump         #Discharge: do not delete    Patient is 50 yo F with past medical history of David disease, GERD/Gastroparesis with chronic nausea, chronic pancreatitis with chronic abdominal pain, diabetes, HTN, obesity, presented with intractable nausea and vomiting following intrathecal pump placement at [facility] by Pain Management Specialist Dr. Sellers     Hospital course (by problem):     #Nausea and vomiting  Pt with chronic nausea presented with intractable nausea and non-bloody vomiting with inability to tolerate PO following intrathecal pump placement.   Minimal improvement with Zolfran, Zyprexa, Ativan, and Tigan. Improved significantly on IV Erythromycin 275 mg with relief of nausea, vomiting, and ability to tolerate PO on 10/7. Differential included Gastroparesis vs. exacerbation of chronic nausea vs. bowel obstruction vs CSF leak. Gastroparesis most likely given Hx and improvement with Erythromycin    - Abdominal XR with no abnormal bowel dilation or pneumoperitoneum   - CT Abdomen with mild non specific changes to small intestine 2/2 to viral/other infectious enteritis vs adynamic ileus   - CT Head with no evidence of CSF leak   - D/c on PO Erythromycin 500 mg TID for 10 days, PO Zofran 8 mg TID, and Compazine   - f/u with GI outpt      #Chronic Abdominal Pain   Pt with chronic abdominal pain 2/2 chronic pancreatitis and gastritis. Pain began in 2005 following episode of severe pancreatitis. Intermittently on opiates for pain management. 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   - c/w Home Pain Regimen: Gabapentin 300 mg, Voltaren   - f/u with Pain Management to discuss medications and intrathecal pump placement     #David Disease  Pt with hx of David disease treated with steroids at home  - C/w Prednisone 4 mg daily  - C/w Hydrocortisone 20 mg BID     #GERD  Pt with chronic GERD. Takes protonix and carafate at home  - C/w Home meds: Protonix 40 mg daily, Carafate 1g QID     #Diabetes  Pt with hx of Diabetes  -managed with ISS while inpatient  - C/w Home meds: Humalog 100 units/mL, Metformin 1000 mg BID, Invokana 300 mg once daily, Ozempic 2mg/1.5ml     Patient was discharged to: home     New medications:   -Erythromycin 500 mg PO TID PRN nausea/vomiting for 10 days   -Compazine 10mg PO TID PRN nausea/vomiting     Changes to old medications: none  Medications that were stopped: none    Items to follow up as outpatient: GI followup appointment, pain management followup appointment with Dr. Torrez for pain pump

## 2021-10-07 NOTE — DISCHARGE NOTE PROVIDER - NSDCCPCAREPLAN_GEN_ALL_CORE_FT
PRINCIPAL DISCHARGE DIAGNOSIS  Diagnosis: Intractable nausea and vomiting  Assessment and Plan of Treatment: You presented with intractable nausea and vomiting with inability to tolerate oral intake following intrathecal pain pump placement. You had minimal improvement with Zofran, Zyprexia, and Tigan. Signicfant improvement was achieved when Erythromycin and Compazine were added. Given your Hx and improvement with Erythromycin, we believe your nausea is likely due to Gastroparesis.   Please continue on oral Erythromycin 500 mg three times a day for 10 days, oral Compazine, and oral Zofran 8 mg three times a day.   Please follow up outatient with your GI specialist Dr. Lisa         SECONDARY DISCHARGE DIAGNOSES  Diagnosis: Chronic abdominal pain  Assessment and Plan of Treatment: You have a history of chronic abdominal pain due to chronic pancreatitis and gastritis. Your pain was persistent throughout your hospital course due to your inability to take oral medications.  Please continue with home pain regimen  Please follow up with pain managment specialist Dr. Harmon to discuss medications and intrathecal pump managment    Diagnosis: [place]'s disease  Assessment and Plan of Treatment: You have a history of [place] disease which was treated with your home regimen of oral Prednisone 4 mg daily and oral Hydrocoritsone 20 mg daily. Solucortef 40 mg IV was given during your hospital stay when you were unable to tolerate oral medications   Please continue with home medications for your David disease    Diagnosis: GERD (gastroesophageal reflux disease)  Assessment and Plan of Treatment: You have a history of GERD  Please continue with home medications oral Carafate 1 g daily and oral famotidine 20 mg twice daily    Diagnosis: Nausea & vomiting  Assessment and Plan of Treatment:      PRINCIPAL DISCHARGE DIAGNOSIS  Diagnosis: Intractable nausea and vomiting  Assessment and Plan of Treatment: You came to us with severe nausea and vomiting with inability to tolerate oral intake following intrathecal pain pump placement. You told us you have been dealing with chronic nausea and vomiting, which is in part related to your chronic pancreatitis. For your nausea/vomiting, you have been taking zofran at home and have tried a number of different medications. While you were in our hospital, you had minimal improvement with Zofran, Zyprexia, and Tigan. However, your nausea/vomiting seemed to respond well to erythromycin and compazine, which were suggested by our GI doctors, who thought your nausea may be due to gastroparesis. We will provide you with prescriptions for a 10-day supply of erythromycin and a 7-day supply of compazine. Both medications can be taken up to three times daily, 8 hours apart. Please follow up with your GI doctor, Dr. Mccarthy, ideally within a week to discuss whether you should continue these medications.   Please seek medical help immediately if you experience any of the following:  - significant worsening of your vomiting  - your vomit has blood or a "coffee grounds" appearance  - you are unable to eat or drink anything without vomiting   - significant worsening of your abdominal pain  - significant diarrhea  - bloody or dark tarry bowel movements   - you are unable to eat or drink anything without vomiting   - chest pain, shortness of breath, fainting      SECONDARY DISCHARGE DIAGNOSES  Diagnosis: Nausea & vomiting  Assessment and Plan of Treatment:     Diagnosis: Chronic abdominal pain  Assessment and Plan of Treatment: You have a history of chronic abdominal pain due to chronic pancreatitis and gastritis. Your pain was persistent throughout your hospital course due to your inability to take oral medications.  Please continue with home pain regimen  Please follow up with pain managment specialist Dr. Harmon to discuss medications and intrathecal pump managment    Diagnosis: [place]'s disease  Assessment and Plan of Treatment: You have a history of [place] disease which was treated with your home regimen of oral Prednisone 4 mg daily and oral Hydrocoritsone 20 mg daily. Solucortef 40 mg IV was given during your hospital stay when you were unable to tolerate oral medications   Please continue with home medications for your [place] disease    Diagnosis: GERD (gastroesophageal reflux disease)  Assessment and Plan of Treatment: You have a history of GERD  Please continue with home medications oral Carafate 1 g daily and oral famotidine 20 mg twice daily

## 2021-10-07 NOTE — DISCHARGE NOTE PROVIDER - PROVIDER TOKENS
FREE:[LAST:[Shari],FIRST:[Nydia],PHONE:[(   )    -],FAX:[(   )    -]],PROVIDER:[TOKEN:[[id number]]]

## 2021-10-07 NOTE — DISCHARGE NOTE NURSING/CASE MANAGEMENT/SOCIAL WORK - PATIENT PORTAL LINK FT
You can access the FollowMyHealth Patient Portal offered by [facility] by registering at the following website: [email or web address]. By joining [employer]’s FollowMyHealth portal, you will also be able to view your health information using other applications (apps) compatible with our system.

## 2021-10-07 NOTE — PROGRESS NOTE ADULT - ASSESSMENT
50yo F with PMH of leah's disease, GERD with chronic nausea, Hx of pancreatitis 2/2 hypertriglyceridemia (2005) and ERCP induced pancreatitis (2015), diabetes, htn, obesity, presents from [facility] with intractable nausea after intrathecal pump implantation earlier today. GI consulted for nausea/vomiting post placement of intra-thecal pump.    #Nausea/vomiting  Patient presenting with acute onset nausea/vomiting s/p intrathecal pump procedure (for pain control) this past Monday 10/4. 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 Since then, patient has been following with pain management and now with increasing opioid requirements, started on Dilaudid 2 mg PO daily, now on Dilaudid 4 mg PO 1-3x daily for adequate pain control. Notes chronic nausea with intermittent episodes of vomiting for which she is on Zofran outpatient which adequately controls her symptoms. Now with acutely worsening nausea/vomiting since procedure.   -Abdominal XR with no abnormal bowel dilatation or pneumoperitoneum  -CT head negative  -Avoid Reglan, reports history of tardive dyskinesia  -Patient has received several anti-emetic medications over the last States Ativan and Olanzapine has been helping her symptoms while inpatient however can consider Compazine 10 mg q8h PRN for nausea/vomiting to help with her symptoms.     -Can consider Zofran for control of nausea/vomiting as notes adequate control previously with medication, if requiring regularly, obtain daily EKGs to monitor QTC  -Remainder of supportive management as per primary team    #Abdominal Pain  Notes history of chronic pancreatitis. No previous abdominal imaging here for comparison however patient reports that she gets q6 month surveillance imaging of ?IPMN and ?pancreatic cysts, likely pseudocysts.   -CT A/P notable for ------  -Obtain records of prior imaging from [facility]    Case discussed with [facility] attending and primary team.    Ondina Wolfe DO  Gastroenterology Fellow  Pager: [phone] 50yo F with PMH of leah's disease, GERD with chronic nausea, Hx of pancreatitis 2/2 hypertriglyceridemia (2005) and ERCP induced pancreatitis (2015), diabetes, htn, obesity, presents from [facility] with intractable nausea after intrathecal pump implantation earlier today. GI consulted for nausea/vomiting post placement of intra-thecal pump.    #Nausea/vomiting  Patient presenting with acute onset nausea/vomiting s/p intrathecal pump procedure (for pain control) this past Monday 10/4. 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 Since then, patient has been following with pain management and now with increasing opioid requirements, started on Dilaudid 2 mg PO daily, now on Dilaudid 4 mg PO 1-3x daily for adequate pain control. Notes chronic nausea with intermittent episodes of vomiting for which she is on Zofran outpatient which adequately controls her symptoms. Now with acutely worsening nausea/vomiting since procedure.   -Abdominal XR with no abnormal bowel dilatation or pneumoperitoneum  -CT head negative  -Avoid Reglan, reports history of tardive dyskinesia  -Patient notes significant relief of her nausea/vomiting however in the context of receiving multiple anti-emetic medications including Ativan, Olanzapine, Compazine and erythromycin. Likely acute episode of nausea/vomiting in the setting of recent anesthesia exposure, now resolving. With baseline nausea and sporadic vomiting, noting her nausea mainly to be associated with opiate use for which Zofran provided adequate relief. Would minimize the amount of anti-emetic medications used and resume home Zofran PRN along with PRN Compazine 10 mg TID for additional relief if needed.   -Remainder of supportive management as per primary team    #Abdominal Pain  Notes history of chronic pancreatitis. No previous abdominal imaging here for comparison, last of which was July 2021, noting q6 month surveillance imaging of ?IPMN   -CT A/P (10/6) notable soft tissue encasement of the celiac, superior mesenteric, right hepatic arteries. Soft tissue encasement versus adenopathy with narrowing versus extrinsic compression of the main portal vein.  -CT imaging findings also consistent with MRCP results from [facility], dated 7/12/2021, noting perivascular/retroperitoneal enhancing soft tissue, with unchanged 2 cm segment of portal vein narrowing, likely resolving post-procedural fibrosis. Patent hepatic vessels  -CT A/P (1/27/21 @ [facility]) also noting similar findings post complication of celiac plexus neurolysis/block, reporting infiltration surrounding the celiac axis in the region of the celiac plexus which could be related to postoperative change. Also with mild circumferential fluid/infiltration around the common hepatic artery and extending toward the ta hepatis. No drainable abscess collection is seen in this region. Other vasculature is patent, there may be slight narrowing of the hepatic artery at the level of circumferential fluid encasement.    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    Patient should have close outpatient follow up with patient's Gastroenterologist, Dr Mccarthy upon discharge.     Ondina Wolfe DO  Gastroenterology Fellow  Pager: [phone]

## 2021-10-07 NOTE — DISCHARGE NOTE PROVIDER - CARE PROVIDER_API CALL
Nydia Mccarthy  Phone: (   )    -  Fax: (   )    -  Follow Up Time:     Do Brian Scherer Tejeda  Anesthesiology  [address] [place] [address]  Phone: [phone]  Fax: [phone]  Follow Up Time:

## 2021-10-07 NOTE — PROGRESS NOTE ADULT - ATTENDING COMMENTS
52yo W with PMH of [place]'s disease, GERD with chronic nausea, chronic pancreatitis (2/2 familial hyperTG, ERCP), DM, HTN, obesity who presented with intractable nausea/vomiting following same-day intrathecal pump implantation.      The patient's CT head was negative. CTAP showed encasement of ta hepatis which the patient was familiar with and may have been detected on prior MRCPs. Her symptoms have improved drastically with multiple agents including olanzapine, lorazepam, compazine, and erythromycin. Would wean medications to minimal meds and doses that effectively control symptoms. Would also use caution with olanzapine and ondansetron in combination as they can prolong QTc. Compazine and olanzapine in combination may cause anticholinergic symotoms and CNS depression.    I had a long discussion with patient and her HCP re: CTAP findings which may be due to injury or other diseases such as IgG 4 disease, sclerosing mesenteritis, vasculitis, lymphoma). They will follow-up with primary GI at [facility] (Dr. Mccarthy, [phone]).     - ensure daily BM - daily Miralax  - pain control per pain management/primary team   - use minimal effective dose/meds for nausea, which is resolved now and likely post-operative in nature  - patient to discuss CT findings with primary GI at [facility]    Zachary Fleming MD  GI Attending
Given recent placement of pump in relation to nausea/vomiting likely leading cause. There is a 20% rate of CSF leak with placement of these pumps, when team discussed with surgeon she seemed to think it was not very likely in this case.      Problem List:  1. Intractable N/V - continue Ativan/Olanzapine, start Erythromycin, GI consult, CTH, CTAP  2. Chronic Pancreatitis/Chronic Pain - Pain Management  3. David's Disease - Plan as above  4. GERD  5. HTN  6. Obesity complicates all aspects of care    Discussed with residents.

## 2021-10-07 NOTE — DISCHARGE NOTE PROVIDER - NSDCMRMEDTOKEN_GEN_ALL_CORE_FT
Bystolic 2.5 mg oral tablet: 1 tab(s) orally once a day  Carafate 1 g oral tablet: 1 tab(s) orally 4 times a day (before meals and at bedtime)  CeleBREX 100 mg oral capsule: 1 cap(s) orally 2 times a day  cholestyramine 4 g/5 g oral powder for reconstitution:   ezetimibe 10 mg oral tablet: 1 tab(s) orally once a day  famotidine 20 mg oral tablet: 1 tab(s) orally 2 times a day  HumaLOG 100 units/mL injectable solution:   hydrocortisone 20 mg oral tablet: 25 milligram(s) orally 2 times a day  HYDROmorphone 4 mg oral tablet: 1 tab(s) orally every 4 hours  Imodium 2 mg oral capsule: 1 cap(s) orally every 4 hours  Invokana 300 mg oral tablet: 1 tab(s) orally once a day  Lipitor 40 mg oral tablet: 1 tab(s) orally once a day  Lomotil 2.5 mg-0.025 mg oral tablet: 2 tab(s) orally 4 times a day  metFORMIN 1000 mg oral tablet: 1 tab(s) orally 2 times a day  Neurontin 300 mg oral capsule: 1 cap(s) orally 3 times a day  NexIUM 40 mg oral delayed release capsule: 1 cap(s) orally once a day  Ozempic 2 mg/1.5 mL (0.25 mg or 0.5 mg dose) subcutaneous solution:   predniSONE 2 mg oral delayed release tablet: 4 milligram(s) orally once a day  Singulair 10 mg oral tablet: 1 tab(s) orally once a day  Solu-CORTEF: 50  injectable  Vascepa 1 g oral capsule: 2 cap(s) orally 2 times a day  Vitamin D3 50 mcg (2000 intl units) oral capsule: 1 cap(s) orally once a day  Voltaren:   Zofran ODT 8 mg oral tablet, disintegratin tab(s) orally 3 times a day   Bystolic 2.5 mg oral tablet: 1 tab(s) orally once a day  Carafate 1 g oral tablet: 1 tab(s) orally 4 times a day (before meals and at bedtime)  CeleBREX 100 mg oral capsule: 1 cap(s) orally 2 times a day  cholestyramine 4 g/5 g oral powder for reconstitution:   erythromycin 250 mg oral delayed release tablet: M2B  8WO 844-01  Anticipated discharge 10/7 ~2PM  [phone]     2 tab(s) orally every 8 hours, As needed, nausea/vomiting  ezetimibe 10 mg oral tablet: 1 tab(s) orally once a day  famotidine 20 mg oral tablet: 1 tab(s) orally 2 times a day  HumaLOG 100 units/mL injectable solution:   hydrocortisone 20 mg oral tablet: 25 milligram(s) orally 2 times a day  HYDROmorphone 4 mg oral tablet: 1 tab(s) orally every 4 hours  Imodium 2 mg oral capsule: 1 cap(s) orally every 4 hours  Invokana 300 mg oral tablet: 1 tab(s) orally once a day  Lipitor 40 mg oral tablet: 1 tab(s) orally once a day  Lomotil 2.5 mg-0.025 mg oral tablet: 2 tab(s) orally 4 times a day  metFORMIN 1000 mg oral tablet: 1 tab(s) orally 2 times a day  Neurontin 300 mg oral capsule: 1 cap(s) orally 3 times a day  NexIUM 40 mg oral delayed release capsule: 1 cap(s) orally once a day  Ozempic 2 mg/1.5 mL (0.25 mg or 0.5 mg dose) subcutaneous solution:   predniSONE 2 mg oral delayed release tablet: 4 milligram(s) orally once a day  prochlorperazine 5 mg oral tablet: 2 tab(s) orally every 8 hours   Singulair 10 mg oral tablet: 1 tab(s) orally once a day  Vascepa 1 g oral capsule: 2 cap(s) orally 2 times a day  Vitamin D3 50 mcg (2000 intl units) oral capsule: 1 cap(s) orally once a day  Voltaren:   Zofran ODT 8 mg oral tablet, disintegratin tab(s) orally 3 times a day   Bystolic 2.5 mg oral tablet: 1 tab(s) orally once a day  Carafate 1 g oral tablet: 1 tab(s) orally 4 times a day (before meals and at bedtime)  CeleBREX 100 mg oral capsule: 1 cap(s) orally 2 times a day  cholestyramine 4 g/5 g oral powder for reconstitution:   erythromycin 250 mg oral delayed release tablet: 2 tab(s) orally every 8 hours, As needed, nausea/vomiting  ezetimibe 10 mg oral tablet: 1 tab(s) orally once a day  famotidine 20 mg oral tablet: 1 tab(s) orally 2 times a day  HumaLOG 100 units/mL injectable solution:   hydrocortisone 20 mg oral tablet: 25 milligram(s) orally 2 times a day  HYDROmorphone 4 mg oral tablet: 1 tab(s) orally every 4 hours  Imodium 2 mg oral capsule: 1 cap(s) orally every 4 hours  Invokana 300 mg oral tablet: 1 tab(s) orally once a day  Lipitor 40 mg oral tablet: 1 tab(s) orally once a day  Lomotil 2.5 mg-0.025 mg oral tablet: 2 tab(s) orally 4 times a day  metFORMIN 1000 mg oral tablet: 1 tab(s) orally 2 times a day  Neurontin 300 mg oral capsule: 1 cap(s) orally 3 times a day  NexIUM 40 mg oral delayed release capsule: 1 cap(s) orally once a day  Ozempic 2 mg/1.5 mL (0.25 mg or 0.5 mg dose) subcutaneous solution:   predniSONE 2 mg oral delayed release tablet: 4 milligram(s) orally once a day  prochlorperazine 5 mg oral tablet: 2 tab(s) orally every 8 hours   Singulair 10 mg oral tablet: 1 tab(s) orally once a day  Vascepa 1 g oral capsule: 2 cap(s) orally 2 times a day  Vitamin D3 50 mcg (2000 intl units) oral capsule: 1 cap(s) orally once a day  Voltaren:   Zofran ODT 8 mg oral tablet, disintegratin tab(s) orally 3 times a day   Bystolic 2.5 mg oral tablet: 1 tab(s) orally once a day  Carafate 1 g oral tablet: 1 tab(s) orally 4 times a day (before meals and at bedtime)  CeleBREX 100 mg oral capsule: 1 cap(s) orally 2 times a day  cholestyramine 4 g/5 g oral powder for reconstitution:   erythromycin 250 mg oral delayed release tablet: 2 tab(s) orally every 8 hours, As needed, nausea/vomiting  ezetimibe 10 mg oral tablet: 1 tab(s) orally once a day  famotidine 20 mg oral tablet: 1 tab(s) orally 2 times a day  HumaLOG 100 units/mL injectable solution:   hydrocortisone 20 mg oral tablet: 25 milligram(s) orally 2 times a day  HYDROmorphone 4 mg oral tablet: 1 tab(s) orally every 4 hours  Invokana 300 mg oral tablet: 1 tab(s) orally once a day  Lipitor 40 mg oral tablet: 1 tab(s) orally once a day  metFORMIN 1000 mg oral tablet: 1 tab(s) orally 2 times a day  Neurontin 300 mg oral capsule: 1 cap(s) orally 3 times a day  NexIUM 40 mg oral delayed release capsule: 1 cap(s) orally once a day  Ozempic 2 mg/1.5 mL (0.25 mg or 0.5 mg dose) subcutaneous solution:   predniSONE 2 mg oral delayed release tablet: 4 milligram(s) orally once a day  prochlorperazine 5 mg oral tablet: 2 tab(s) orally every 8 hours   Singulair 10 mg oral tablet: 1 tab(s) orally once a day  Vascepa 1 g oral capsule: 2 cap(s) orally 2 times a day  Vitamin D3 50 mcg (2000 intl units) oral capsule: 1 cap(s) orally once a day  Voltaren:   Zofran ODT 8 mg oral tablet, disintegratin tab(s) orally 3 times a day

## 2021-10-07 NOTE — PROGRESS NOTE ADULT - SUBJECTIVE AND OBJECTIVE BOX
GASTROENTEROLOGY PROGRESS NOTE  Patient seen and examined at bedside. CT head negative. CT A/P notable soft tissue encasement of the celiac, superior mesenteric, right hepatic arteries. Soft tissue encasement versus adenopathy with narrowing versus extrinsic compression of the main portal vein.    ROS: Patient notes significant improvement in her nausea/vomiting. Reports last time she vomited was 3 times, late afternoon yesterday. Also reporting regular BMs, last episode was yesterday.     PERTINENT REVIEW OF SYSTEMS:  CONSTITUTIONAL: No weakness, fevers or chills  HEENT: No visual changes; No vertigo or throat pain   GASTROINTESTINAL: As above.  NEUROLOGICAL: No numbness or weakness  SKIN: No itching, burning, rashes, or lesions     Allergies    amoxicillin (Unknown)  penicillin (Unknown)  Reglan (Unknown)  Topamax (Unknown)  Tylenol (Unknown)    Intolerances      MEDICATIONS:  MEDICATIONS  (STANDING):  atorvastatin 40 milliGRAM(s) Oral at bedtime  cholecalciferol 2000 Unit(s) Oral daily  dextrose 40% Gel 15 Gram(s) Oral once  dextrose 5%. 1000 milliLiter(s) (50 mL/Hr) IV Continuous <Continuous>  dextrose 5%. 1000 milliLiter(s) (100 mL/Hr) IV Continuous <Continuous>  dextrose 50% Injectable 25 Gram(s) IV Push once  dextrose 50% Injectable 12.5 Gram(s) IV Push once  dextrose 50% Injectable 25 Gram(s) IV Push once  enoxaparin Injectable 40 milliGRAM(s) SubCutaneous two times a day  erythromycin     enteric coated 500 milliGRAM(s) Oral every 8 hours  gabapentin 300 milliGRAM(s) Oral three times a day  glucagon  Injectable 1 milliGRAM(s) IntraMuscular once  hydrocortisone 25 milliGRAM(s) Oral two times a day  influenza   Vaccine 0.5 milliLiter(s) IntraMuscular once  insulin lispro (ADMELOG) corrective regimen sliding scale   SubCutaneous three times a day before meals  montelukast 10 milliGRAM(s) Oral daily  pantoprazole  Injectable 40 milliGRAM(s) IV Push daily  predniSONE   Tablet 4 milliGRAM(s) Oral daily  sucralfate 1 Gram(s) Oral four times a day    MEDICATIONS  (PRN):  celecoxib 100 milliGRAM(s) Oral two times a day PRN Mild Pain (1 - 3)  cholestyramine Powder (Sugar-Free) 4 Gram(s) Oral four times a day PRN diarrhea  diclofenac 25 milliGRAM(s) Oral two times a day PRN Moderate Pain (4 - 6)  HYDROmorphone   Tablet 4 milliGRAM(s) Oral every 4 hours PRN Severe Pain (7 - 10)  ondansetron Injectable 4 milliGRAM(s) IV Push every 8 hours PRN Nausea and/or Vomiting  prochlorperazine   Tablet 10 milliGRAM(s) Oral every 8 hours PRN breakthrough nausea    Vital Signs Last 24 Hrs  T(C): 36.8 (07 Oct 2021 08:55), Max: 36.9 (06 Oct 2021 20:33)  T(F): 98.2 (07 Oct 2021 08:55), Max: 98.4 (06 Oct 2021 20:33)  HR: 90 (07 Oct 2021 08:55) (90 - 110)  BP: 145/80 (07 Oct 2021 08:55) (112/58 - 175/89)  BP(mean): --  RR: 18 (07 Oct 2021 08:55) (16 - 18)  SpO2: 98% (07 Oct 2021 08:55) (96% - 98%)    10-07 @ 07:01  -  10-07 @ 15:16  --------------------------------------------------------  IN: 240 mL / OUT: 400 mL / NET: -160 mL      PHYSICAL EXAM:    Constitutional: obese  female sitting up in bed, appears comfortable  HEENT: PERRL, EOMI, sclera anicteric, neck supple, MMM  Back: no midline tenderness as noted yesterday (10/6)  Gastrointestinal: obese, tenderness to palpation localized to epigastric region  Extremities: Warm and well perfused; 2+ pulses equal bilateral upper and lower extremities; no edema, cyanosis, or clubbing  Neurological: AOx3, CN II-XII grossly intact, no focal deficits     LABS:                        13.3   8.39  )-----------( 308      ( 07 Oct 2021 07:36 )             41.5     10-07    138  |  102  |  13  ----------------------------<  158<H>  4.0   |  24  |  0.68    Ca    9.6      07 Oct 2021 07:36  Phos  3.3     10-07  Mg     1.9     10-07                        Culture - Urine (collected 05 Oct 2021 01:30)  Source: Clean Catch Clean Catch (Midstream)  Final Report (06 Oct 2021 11:27):    No growth      RADIOLOGY & ADDITIONAL STUDIES:  Reviewed

## 2021-10-12 PROBLEM — E27.1 PRIMARY ADRENOCORTICAL INSUFFICIENCY: Chronic | Status: ACTIVE | Noted: 2021-10-05

## 2021-10-12 PROBLEM — R11.0 NAUSEA: Chronic | Status: ACTIVE | Noted: 2021-10-05

## 2021-10-12 PROBLEM — K21.9 GASTRO-ESOPHAGEAL REFLUX DISEASE WITHOUT ESOPHAGITIS: Chronic | Status: ACTIVE | Noted: 2021-10-05

## 2021-10-12 PROBLEM — I10 ESSENTIAL (PRIMARY) HYPERTENSION: Chronic | Status: ACTIVE | Noted: 2021-10-05

## 2021-10-13 DIAGNOSIS — Z79.84 LONG TERM (CURRENT) USE OF ORAL HYPOGLYCEMIC DRUGS: ICD-10-CM

## 2021-10-13 DIAGNOSIS — Z88.6 ALLERGY STATUS TO ANALGESIC AGENT: ICD-10-CM

## 2021-10-13 DIAGNOSIS — E66.9 OBESITY, UNSPECIFIED: ICD-10-CM

## 2021-10-13 DIAGNOSIS — K86.1 OTHER CHRONIC PANCREATITIS: ICD-10-CM

## 2021-10-13 DIAGNOSIS — Z88.0 ALLERGY STATUS TO PENICILLIN: ICD-10-CM

## 2021-10-13 DIAGNOSIS — Z79.52 LONG TERM (CURRENT) USE OF SYSTEMIC STEROIDS: ICD-10-CM

## 2021-10-13 DIAGNOSIS — E78.49 OTHER HYPERLIPIDEMIA: ICD-10-CM

## 2021-10-13 DIAGNOSIS — K21.9 GASTRO-ESOPHAGEAL REFLUX DISEASE WITHOUT ESOPHAGITIS: ICD-10-CM

## 2021-10-13 DIAGNOSIS — K31.84 GASTROPARESIS: ICD-10-CM

## 2021-10-13 DIAGNOSIS — I10 ESSENTIAL (PRIMARY) HYPERTENSION: ICD-10-CM

## 2021-10-13 DIAGNOSIS — R11.0 NAUSEA: ICD-10-CM

## 2021-10-13 DIAGNOSIS — R11.2 NAUSEA WITH VOMITING, UNSPECIFIED: ICD-10-CM

## 2021-10-13 DIAGNOSIS — T41.45XA ADVERSE EFFECT OF UNSPECIFIED ANESTHETIC, INITIAL ENCOUNTER: ICD-10-CM

## 2021-10-13 DIAGNOSIS — J45.991 COUGH VARIANT ASTHMA: ICD-10-CM

## 2021-10-13 DIAGNOSIS — Z79.4 LONG TERM (CURRENT) USE OF INSULIN: ICD-10-CM

## 2021-10-13 DIAGNOSIS — E11.9 TYPE 2 DIABETES MELLITUS WITHOUT COMPLICATIONS: ICD-10-CM

## 2021-10-13 DIAGNOSIS — Z88.1 ALLERGY STATUS TO OTHER ANTIBIOTIC AGENTS STATUS: ICD-10-CM

## 2021-10-13 DIAGNOSIS — E27.1 PRIMARY ADRENOCORTICAL INSUFFICIENCY: ICD-10-CM

## 2021-10-14 DIAGNOSIS — Z01.818 ENCOUNTER FOR OTHER PREPROCEDURAL EXAMINATION: ICD-10-CM

## 2021-10-15 ENCOUNTER — APPOINTMENT (OUTPATIENT)
Dept: DISASTER EMERGENCY | Facility: CLINIC | Age: 51
End: 2021-10-15

## 2021-10-15 VITALS
HEART RATE: 97 BPM | TEMPERATURE: 98 F | WEIGHT: 293 LBS | SYSTOLIC BLOOD PRESSURE: 125 MMHG | RESPIRATION RATE: 18 BRPM | HEIGHT: 70 IN | DIASTOLIC BLOOD PRESSURE: 61 MMHG | OXYGEN SATURATION: 97 %

## 2021-10-15 RX ORDER — SEMAGLUTIDE 0.68 MG/ML
0 INJECTION, SOLUTION SUBCUTANEOUS
Qty: 0 | Refills: 0 | DISCHARGE

## 2021-10-15 RX ORDER — HYDROCORTISONE 20 MG
25 TABLET ORAL
Qty: 0 | Refills: 0 | DISCHARGE

## 2021-10-15 RX ORDER — HYDROCORTISONE 20 MG
0 TABLET ORAL
Qty: 0 | Refills: 0 | DISCHARGE

## 2021-10-15 RX ORDER — HYDROMORPHONE HYDROCHLORIDE 2 MG/ML
1 INJECTION INTRAMUSCULAR; INTRAVENOUS; SUBCUTANEOUS
Qty: 0 | Refills: 0 | DISCHARGE

## 2021-10-15 RX ORDER — ESOMEPRAZOLE MAGNESIUM 40 MG/1
1 CAPSULE, DELAYED RELEASE ORAL
Qty: 0 | Refills: 0 | DISCHARGE

## 2021-10-15 NOTE — ASU PATIENT PROFILE, ADULT - TEACHING/LEARNING DEVELOPMENTAL CONSIDERATIONS
PROCEDURE INFORMATION: 

Exam: CT Angiography Chest With Contrast 

Exam date and time: 10/7/2019 11:35 PM 

Clinical history: 20 years old, female; Chest pain; Type not specified; Patient 

HX: HX pe; Additional info: Cp 



TECHNIQUE: 

Imaging protocol: Computed tomographic angiography of the chest with 

intravenous contrast. 

3D rendering: MIP reconstructed images were created and reviewed. 

Radiation optimization: All CT scans at this facility use at least one of these 

dose optimization techniques: automated exposure control; mA and/or kV 

adjustment per patient size (includes targeted exams where dose is matched to 

clinical indication); or iterative reconstruction. 

Contrast material: ISO; Contrast volume: 75 ml; Contrast route: AC;  



COMPARISON: 

CT ANGIO CHEST 2/27/2019 1:13 AM 



FINDINGS: 

Pulmonary arteries: The main pulmonary artery measures 22 mm. No pulmonary 

embolism is identified. 

Aorta: The ascending thoracic aorta measures 24 mm. 

Lungs: Unremarkable. No consolidation. No masses. 

Pleural space: Unremarkable. No pneumothorax. No pleural effusion. 

Heart: Unremarkable. No cardiomegaly. No pericardial effusion. 

Mediastinum: There is soft tissue conforming to the anterior mediastinum 

consistent with residual thymic tissue. 

Gallbladder and bile ducts: The gallbladder is contracted with no stones. 

Lymph nodes: Unremarkable. No enlarged lymph nodes. 

Bones/joints: Unremarkable. No acute fracture. 

Soft tissues: Unremarkable. 



IMPRESSION: 

Negative CTA chest without change from 2/27/2019. No acute interval pulmonary 

embolism is identified. 



Electronically signed by: Shahriar Ventura On 10/08/2019  00:39:08 AM
none

## 2021-10-15 NOTE — ASU PREOP CHECKLIST - 1.
DOS FSBG Patient has continuous insulin pump in place on surgical settings. Also has external glucose monitor

## 2021-10-15 NOTE — ASU PATIENT PROFILE, ADULT - PAIN LOCATION, PROFILE
central epigastric pain, "belt" pain r/t pancreatitis, RUQ abd pain, joint pain hands, shoulders, top of feet

## 2021-10-15 NOTE — ASU PATIENT PROFILE, ADULT - NSICDXPASTSURGICALHX_GEN_ALL_CORE_FT
PAST SURGICAL HISTORY:  H/O dilation and curettage     H/O oral surgery     History of hand surgery     History of laparoscopic cholecystectomy     History of sphincterotomy of sphincter of Oddi

## 2021-10-15 NOTE — ASU PREOP CHECKLIST - SELECT TESTS ORDERED
CBC/CMP/PT/PTT/INR/EKG/COVID-19 CBC/CMP/PT/PTT/INR/EKG/POCT Blood Glucose/COVID-19 CBC/CMP/PT/PTT/INR/UCG/EKG/POCT Blood Glucose/COVID-19

## 2021-10-15 NOTE — ASU PATIENT PROFILE, ADULT - NSICDXPASTMEDICALHX_GEN_ALL_CORE_FT
PAST MEDICAL HISTORY:  David's disease     Chronic nausea     Diabetes     Gastroparesis     GERD (gastroesophageal reflux disease)     History of arthritis     History of chronic gastritis     History of chronic pancreatitis     HTN (hypertension)     Nonalcoholic fatty liver disease

## 2021-10-15 NOTE — ASU PREOP CHECKLIST - ANTIBIOTIC
How Severe Are Your Spot(S)?: mild
What Is The Reason For Today's Visit?: Full Body Skin Examination
What Is The Reason For Today's Visit? (Being Monitored For X): concerning skin lesions on an annual basis
n/a

## 2021-10-17 ENCOUNTER — TRANSCRIPTION ENCOUNTER (OUTPATIENT)
Age: 51
End: 2021-10-17

## 2021-10-17 LAB — SARS-COV-2 N GENE NPH QL NAA+PROBE: NOT DETECTED

## 2021-10-18 ENCOUNTER — INPATIENT (INPATIENT)
Facility: HOSPITAL | Age: 51
LOS: 2 days | Discharge: HOME | DRG: 357 | End: 2021-10-21
Attending: STUDENT IN AN ORGANIZED HEALTH CARE EDUCATION/TRAINING PROGRAM | Admitting: STUDENT IN AN ORGANIZED HEALTH CARE EDUCATION/TRAINING PROGRAM
Payer: MEDICARE

## 2021-10-18 DIAGNOSIS — Z79.84 LONG TERM (CURRENT) USE OF ORAL HYPOGLYCEMIC DRUGS: ICD-10-CM

## 2021-10-18 DIAGNOSIS — E27.1 PRIMARY ADRENOCORTICAL INSUFFICIENCY: ICD-10-CM

## 2021-10-18 DIAGNOSIS — J45.909 UNSPECIFIED ASTHMA, UNCOMPLICATED: ICD-10-CM

## 2021-10-18 DIAGNOSIS — R11.2 NAUSEA WITH VOMITING, UNSPECIFIED: ICD-10-CM

## 2021-10-18 DIAGNOSIS — K86.1 OTHER CHRONIC PANCREATITIS: ICD-10-CM

## 2021-10-18 DIAGNOSIS — K21.9 GASTRO-ESOPHAGEAL REFLUX DISEASE WITHOUT ESOPHAGITIS: ICD-10-CM

## 2021-10-18 DIAGNOSIS — Z90.49 ACQUIRED ABSENCE OF OTHER SPECIFIED PARTS OF DIGESTIVE TRACT: ICD-10-CM

## 2021-10-18 DIAGNOSIS — E78.5 HYPERLIPIDEMIA, UNSPECIFIED: ICD-10-CM

## 2021-10-18 DIAGNOSIS — Z88.8 ALLERGY STATUS TO OTHER DRUGS, MEDICAMENTS AND BIOLOGICAL SUBSTANCES: ICD-10-CM

## 2021-10-18 DIAGNOSIS — Z90.49 ACQUIRED ABSENCE OF OTHER SPECIFIED PARTS OF DIGESTIVE TRACT: Chronic | ICD-10-CM

## 2021-10-18 DIAGNOSIS — I10 ESSENTIAL (PRIMARY) HYPERTENSION: ICD-10-CM

## 2021-10-18 DIAGNOSIS — T41.205A ADVERSE EFFECT OF UNSPECIFIED GENERAL ANESTHETICS, INITIAL ENCOUNTER: ICD-10-CM

## 2021-10-18 DIAGNOSIS — Z23 ENCOUNTER FOR IMMUNIZATION: ICD-10-CM

## 2021-10-18 DIAGNOSIS — K29.50 UNSPECIFIED CHRONIC GASTRITIS WITHOUT BLEEDING: ICD-10-CM

## 2021-10-18 DIAGNOSIS — E11.9 TYPE 2 DIABETES MELLITUS WITHOUT COMPLICATIONS: ICD-10-CM

## 2021-10-18 DIAGNOSIS — Z96.41 PRESENCE OF INSULIN PUMP (EXTERNAL) (INTERNAL): ICD-10-CM

## 2021-10-18 DIAGNOSIS — E66.9 OBESITY, UNSPECIFIED: ICD-10-CM

## 2021-10-18 DIAGNOSIS — G89.4 CHRONIC PAIN SYNDROME: ICD-10-CM

## 2021-10-18 DIAGNOSIS — E13.43 OTHER SPECIFIED DIABETES MELLITUS WITH DIABETIC AUTONOMIC (POLY)NEUROPATHY: ICD-10-CM

## 2021-10-18 DIAGNOSIS — Z98.890 OTHER SPECIFIED POSTPROCEDURAL STATES: Chronic | ICD-10-CM

## 2021-10-18 DIAGNOSIS — R11.0 NAUSEA: ICD-10-CM

## 2021-10-18 DIAGNOSIS — Z88.0 ALLERGY STATUS TO PENICILLIN: ICD-10-CM
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
## 2021-10-18 DIAGNOSIS — R10.9 UNSPECIFIED ABDOMINAL PAIN: ICD-10-CM

## 2021-10-18 DIAGNOSIS — K31.84 GASTROPARESIS: ICD-10-CM

## 2021-10-18 DIAGNOSIS — Z29.9 ENCOUNTER FOR PROPHYLACTIC MEASURES, UNSPECIFIED: ICD-10-CM

## 2021-10-18 DIAGNOSIS — Z79.4 LONG TERM (CURRENT) USE OF INSULIN: ICD-10-CM

## 2021-10-18 LAB
ALBUMIN SERPL ELPH-MCNC: 4 G/DL — SIGNIFICANT CHANGE UP (ref 3.3–5)
ALP SERPL-CCNC: 71 U/L — SIGNIFICANT CHANGE UP (ref 40–120)
ALT FLD-CCNC: 37 U/L — SIGNIFICANT CHANGE UP (ref 10–45)
ANION GAP SERPL CALC-SCNC: 13 MMOL/L — SIGNIFICANT CHANGE UP (ref 5–17)
AST SERPL-CCNC: 18 U/L — SIGNIFICANT CHANGE UP (ref 10–40)
BASOPHILS # BLD AUTO: 0.03 K/UL — SIGNIFICANT CHANGE UP (ref 0–0.2)
BASOPHILS NFR BLD AUTO: 0.3 % — SIGNIFICANT CHANGE UP (ref 0–2)
BILIRUB SERPL-MCNC: 0.2 MG/DL — SIGNIFICANT CHANGE UP (ref 0.2–1.2)
BUN SERPL-MCNC: 14 MG/DL — SIGNIFICANT CHANGE UP (ref 7–23)
CALCIUM SERPL-MCNC: 9.3 MG/DL — SIGNIFICANT CHANGE UP (ref 8.4–10.5)
CHLORIDE SERPL-SCNC: 103 MMOL/L — SIGNIFICANT CHANGE UP (ref 96–108)
CO2 SERPL-SCNC: 25 MMOL/L — SIGNIFICANT CHANGE UP (ref 22–31)
CREAT SERPL-MCNC: 0.71 MG/DL — SIGNIFICANT CHANGE UP (ref 0.5–1.3)
EOSINOPHIL # BLD AUTO: 0 K/UL — SIGNIFICANT CHANGE UP (ref 0–0.5)
EOSINOPHIL NFR BLD AUTO: 0 % — SIGNIFICANT CHANGE UP (ref 0–6)
GLUCOSE BLDC GLUCOMTR-MCNC: 154 MG/DL — HIGH (ref 70–99)
GLUCOSE BLDC GLUCOMTR-MCNC: 237 MG/DL — HIGH (ref 70–99)
GLUCOSE SERPL-MCNC: 154 MG/DL — HIGH (ref 70–99)
HCT VFR BLD CALC: 41.6 % — SIGNIFICANT CHANGE UP (ref 34.5–45)
HGB BLD-MCNC: 13.5 G/DL — SIGNIFICANT CHANGE UP (ref 11.5–15.5)
IMM GRANULOCYTES NFR BLD AUTO: 0.9 % — SIGNIFICANT CHANGE UP (ref 0–1.5)
LYMPHOCYTES # BLD AUTO: 0.73 K/UL — LOW (ref 1–3.3)
LYMPHOCYTES # BLD AUTO: 7.1 % — LOW (ref 13–44)
MAGNESIUM SERPL-MCNC: 1.7 MG/DL — SIGNIFICANT CHANGE UP (ref 1.6–2.6)
MCHC RBC-ENTMCNC: 30.1 PG — SIGNIFICANT CHANGE UP (ref 27–34)
MCHC RBC-ENTMCNC: 32.5 GM/DL — SIGNIFICANT CHANGE UP (ref 32–36)
MCV RBC AUTO: 92.9 FL — SIGNIFICANT CHANGE UP (ref 80–100)
MONOCYTES # BLD AUTO: 0.18 K/UL — SIGNIFICANT CHANGE UP (ref 0–0.9)
MONOCYTES NFR BLD AUTO: 1.8 % — LOW (ref 2–14)
NEUTROPHILS # BLD AUTO: 9.22 K/UL — HIGH (ref 1.8–7.4)
NEUTROPHILS NFR BLD AUTO: 89.9 % — HIGH (ref 43–77)
NRBC # BLD: 0 /100 WBCS — SIGNIFICANT CHANGE UP (ref 0–0)
PHOSPHATE SERPL-MCNC: 3.8 MG/DL — SIGNIFICANT CHANGE UP (ref 2.5–4.5)
PLATELET # BLD AUTO: 386 K/UL — SIGNIFICANT CHANGE UP (ref 150–400)
POTASSIUM SERPL-MCNC: 4.3 MMOL/L — SIGNIFICANT CHANGE UP (ref 3.5–5.3)
POTASSIUM SERPL-SCNC: 4.3 MMOL/L — SIGNIFICANT CHANGE UP (ref 3.5–5.3)
PROT SERPL-MCNC: 6.7 G/DL — SIGNIFICANT CHANGE UP (ref 6–8.3)
RBC # BLD: 4.48 M/UL — SIGNIFICANT CHANGE UP (ref 3.8–5.2)
RBC # FLD: 13.7 % — SIGNIFICANT CHANGE UP (ref 10.3–14.5)
SODIUM SERPL-SCNC: 141 MMOL/L — SIGNIFICANT CHANGE UP (ref 135–145)
WBC # BLD: 10.25 K/UL — SIGNIFICANT CHANGE UP (ref 3.8–10.5)
WBC # FLD AUTO: 10.25 K/UL — SIGNIFICANT CHANGE UP (ref 3.8–10.5)

## 2021-10-18 PROCEDURE — 99223 1ST HOSP IP/OBS HIGH 75: CPT | Mod: GC

## 2021-10-18 RX ORDER — INFLUENZA VIRUS VACCINE 15; 15; 15; 15 UG/.5ML; UG/.5ML; UG/.5ML; UG/.5ML
0.5 SUSPENSION INTRAMUSCULAR ONCE
Refills: 0 | Status: COMPLETED | OUTPATIENT
Start: 2021-10-18 | End: 2021-10-21

## 2021-10-18 RX ORDER — GLUCAGON INJECTION, SOLUTION 0.5 MG/.1ML
1 INJECTION, SOLUTION SUBCUTANEOUS ONCE
Refills: 0 | Status: DISCONTINUED | OUTPATIENT
Start: 2021-10-18 | End: 2021-10-21

## 2021-10-18 RX ORDER — GABAPENTIN 400 MG/1
300 CAPSULE ORAL EVERY 8 HOURS
Refills: 0 | Status: DISCONTINUED | OUTPATIENT
Start: 2021-10-18 | End: 2021-10-21

## 2021-10-18 RX ORDER — HYDROCORTISONE 20 MG
50 TABLET ORAL EVERY 8 HOURS
Refills: 0 | Status: DISCONTINUED | OUTPATIENT
Start: 2021-10-18 | End: 2021-10-19

## 2021-10-18 RX ORDER — ATORVASTATIN CALCIUM 80 MG/1
40 TABLET, FILM COATED ORAL AT BEDTIME
Refills: 0 | Status: DISCONTINUED | OUTPATIENT
Start: 2021-10-18 | End: 2021-10-18

## 2021-10-18 RX ORDER — CELECOXIB 200 MG/1
100 CAPSULE ORAL EVERY 12 HOURS
Refills: 0 | Status: DISCONTINUED | OUTPATIENT
Start: 2021-10-18 | End: 2021-10-21

## 2021-10-18 RX ORDER — SODIUM CHLORIDE 9 MG/ML
1000 INJECTION, SOLUTION INTRAVENOUS
Refills: 0 | Status: DISCONTINUED | OUTPATIENT
Start: 2021-10-18 | End: 2021-10-21

## 2021-10-18 RX ORDER — DEXTROSE 50 % IN WATER 50 %
15 SYRINGE (ML) INTRAVENOUS ONCE
Refills: 0 | Status: DISCONTINUED | OUTPATIENT
Start: 2021-10-18 | End: 2021-10-21

## 2021-10-18 RX ORDER — DEXTROSE 50 % IN WATER 50 %
25 SYRINGE (ML) INTRAVENOUS ONCE
Refills: 0 | Status: DISCONTINUED | OUTPATIENT
Start: 2021-10-18 | End: 2021-10-21

## 2021-10-18 RX ORDER — SUCRALFATE 1 G
1 TABLET ORAL EVERY 6 HOURS
Refills: 0 | Status: DISCONTINUED | OUTPATIENT
Start: 2021-10-18 | End: 2021-10-21

## 2021-10-18 RX ORDER — ONDANSETRON 8 MG/1
4 TABLET, FILM COATED ORAL ONCE
Refills: 0 | Status: COMPLETED | OUTPATIENT
Start: 2021-10-18 | End: 2021-10-18

## 2021-10-18 RX ORDER — PANTOPRAZOLE SODIUM 20 MG/1
40 TABLET, DELAYED RELEASE ORAL DAILY
Refills: 0 | Status: DISCONTINUED | OUTPATIENT
Start: 2021-10-18 | End: 2021-10-21

## 2021-10-18 RX ORDER — ONDANSETRON 8 MG/1
8 TABLET, FILM COATED ORAL EVERY 8 HOURS
Refills: 0 | Status: DISCONTINUED | OUTPATIENT
Start: 2021-10-18 | End: 2021-10-18

## 2021-10-18 RX ORDER — DEXTROSE 50 % IN WATER 50 %
12.5 SYRINGE (ML) INTRAVENOUS ONCE
Refills: 0 | Status: DISCONTINUED | OUTPATIENT
Start: 2021-10-18 | End: 2021-10-21

## 2021-10-18 RX ORDER — FAMOTIDINE 10 MG/ML
20 INJECTION INTRAVENOUS
Refills: 0 | Status: DISCONTINUED | OUTPATIENT
Start: 2021-10-18 | End: 2021-10-21

## 2021-10-18 RX ORDER — SODIUM CHLORIDE 9 MG/ML
1000 INJECTION INTRAMUSCULAR; INTRAVENOUS; SUBCUTANEOUS
Refills: 0 | Status: DISCONTINUED | OUTPATIENT
Start: 2021-10-18 | End: 2021-10-21

## 2021-10-18 RX ORDER — ERYTHROMYCIN ETHYLSUCCINATE 400 MG
500 TABLET ORAL EVERY 8 HOURS
Refills: 0 | Status: DISCONTINUED | OUTPATIENT
Start: 2021-10-18 | End: 2021-10-19

## 2021-10-18 RX ORDER — SUCRALFATE 1 G
1 TABLET ORAL
Refills: 0 | Status: DISCONTINUED | OUTPATIENT
Start: 2021-10-18 | End: 2021-10-18

## 2021-10-18 RX ORDER — INSULIN LISPRO 100/ML
VIAL (ML) SUBCUTANEOUS
Refills: 0 | Status: DISCONTINUED | OUTPATIENT
Start: 2021-10-18 | End: 2021-10-21

## 2021-10-18 RX ORDER — ATORVASTATIN CALCIUM 80 MG/1
40 TABLET, FILM COATED ORAL AT BEDTIME
Refills: 0 | Status: DISCONTINUED | OUTPATIENT
Start: 2021-10-18 | End: 2021-10-21

## 2021-10-18 RX ORDER — MONTELUKAST 4 MG/1
10 TABLET, CHEWABLE ORAL DAILY
Refills: 0 | Status: DISCONTINUED | OUTPATIENT
Start: 2021-10-18 | End: 2021-10-21

## 2021-10-18 RX ADMIN — Medication 0.5 MILLIGRAM(S): at 18:00

## 2021-10-18 RX ADMIN — Medication 200 MILLIGRAM(S): at 23:15

## 2021-10-18 RX ADMIN — GABAPENTIN 300 MILLIGRAM(S): 400 CAPSULE ORAL at 22:30

## 2021-10-18 RX ADMIN — ONDANSETRON 4 MILLIGRAM(S): 8 TABLET, FILM COATED ORAL at 16:30

## 2021-10-18 RX ADMIN — Medication 50 MILLIGRAM(S): at 22:29

## 2021-10-18 RX ADMIN — ATORVASTATIN CALCIUM 40 MILLIGRAM(S): 80 TABLET, FILM COATED ORAL at 22:30

## 2021-10-18 RX ADMIN — SODIUM CHLORIDE 100 MILLILITER(S): 9 INJECTION INTRAMUSCULAR; INTRAVENOUS; SUBCUTANEOUS at 22:29

## 2021-10-18 NOTE — H&P ADULT - PROBLEM SELECTOR PLAN 3
Pt with hx of diabetes 2/2 chronic pancreatitis. HbA1c 6.3, last admission  Home PO meds: Invokana 300 mg daily, metformin 1000 mg BID when able to tolerate oral   Plan:  - Hold home PO Invokana 300 mg daily, metformin 1000 mg BID while inpatient  - Continue insulin lispro sliding scale sq premeal TID Pt with hx of diabetes 2/2 chronic pancreatitis. HbA1c 6.3, last admission  Home PO meds: Invokana 300 mg daily, metformin 1000 mg BID when able to tolerate oral   Plan:  - Hold home PO Invokana 300 mg daily, metformin 1000 mg BID while inpatient  - Continue insulin lispro sliding scale   - Hold insulin pump Pt with hx of diabetes 2/2 chronic pancreatitis. HbA1c 6.3, last admission  Home PO meds: Invokana 300 mg daily, metformin 1000 mg BID when able to tolerate oral   Plan:  - Hold home PO Invokana 300 mg daily, metformin 1000 mg BID while inpatient  - Continue insulin lispro sliding scale   - Hold insulin pump    ADDENDUM: pt deferred use of holding insulin pump as her endocrinologist had instructed and programmed insulin pump to accommodate stress dose steroids o/n. will c/w insulin pump o/n, consult endocrine in AM

## 2021-10-18 NOTE — H&P ADULT - ATTENDING COMMENTS
pt seen and examined overnight. in NAD post procedure, had an episode of mild nause and vomting prior to being seen. pt did not want to use ISS, had instructions from her outside endocrinologist on adjusting insulin pump w/ use of stress dose steroids. Pt's endocrinologist programmed an insulin pump regimen to factor in stress dose steroids     -on CLD, IVFs o/n, ADAT  -c/w stress dose steroids, anti-emetic regimen  -endocrinology consult for insulin pump management pt seen and examined overnight. in NAD post procedure, had an episode of mild nause and vomting prior to being seen. pt did not want to use ISS, had instructions from her outside endocrinologist on adjusting insulin pump w/ use of stress dose steroids. Pt's endocrinologist programmed an insulin pump regimen to factor in stress dose steroids     -on CLD, IVFs o/n, ADAT  -c/w stress dose steroids, anti-emetic regimen; pt to use her insulin pump overnight, pt deferred using ISS;   -endocrinology consult for insulin pump management pt seen and examined overnight. in NAD post procedure, MMM;cardiac/ lung exam as above; reoprts chronic abdominal pain on palpation through abdomen; had an episode of mild nausea and vomting prior to being seen. pt did not want to use ISS, had instructions from her outside endocrinologist on adjusting insulin pump w/ use of stress dose steroids. Pt's endocrinologist programmed an insulin pump regimen to factor in stress dose steroids     -on CLD, IVFs o/n, ADAT  -c/w stress dose steroids, anti-emetic regimen (pt o/n want to use erythromycin as first line agent); pt to use her insulin pump overnight, pt deferred using ISS;   -endocrinology consult for insulin pump management recs if any     rest of plane as above, outlined and d/w Dr. Encarnacion

## 2021-10-18 NOTE — H&P ADULT - NSHPPHYSICALEXAM_GEN_ALL_CORE
VITALS: T(C): 36.4 (10-18-21 @ 15:34), Max: 36.4 (10-18-21 @ 15:34)  T(F): 97.6 (10-18-21 @ 15:34), Max: 97.6 (10-18-21 @ 15:34)  HR: 89 (10-18-21 @ 16:34) (89 - 97)  BP: 122/59 (10-18-21 @ 16:34) (108/53 - 125/55)  ABP: --  ABP(mean): --  RR: 17 (10-18-21 @ 16:34) (17 - 22)  SpO2: 99% (10-18-21 @ 16:34) (99% - 100%)    GENERAL: NAD, lying in bed comfortably  HEAD:  Atraumatic, Normocephalic  EYES: EOMI, PERRLA, conjunctiva and sclera clear  ENT: Moist mucous membranes  NECK: Supple, No JVD  CHEST/LUNG: Clear to auscultation bilaterally; No rales, rhonchi, wheezing, or rubs. Unlabored respirations  HEART: Regular rate and rhythm; No murmurs, rubs, or gallops  ABDOMEN: Bowel sounds present; Soft, Nontender, Nondistended. No hepatomegally  EXTREMITIES:  2+ Peripheral Pulses, brisk capillary refill. No clubbing, cyanosis, or edema  NERVOUS SYSTEM:  Alert & Oriented X3, speech clear. No deficits   MSK: FROM all 4 extremities, full and equal strength  SKIN: No rashes or lesions VITALS: T(C): 36.4 (10-18-21 @ 15:34), Max: 36.4 (10-18-21 @ 15:34)  T(F): 97.6 (10-18-21 @ 15:34), Max: 97.6 (10-18-21 @ 15:34)  HR: 89 (10-18-21 @ 16:34) (89 - 97)  BP: 122/59 (10-18-21 @ 16:34) (108/53 - 125/55)  ABP: --  ABP(mean): --  RR: 17 (10-18-21 @ 16:34) (17 - 22)  SpO2: 99% (10-18-21 @ 16:34) (99% - 100%)    GENERAL: Obese, NAD, lying in bed comfortably  HEAD:  Atraumatic, Normocephalic  EYES: EOMI, PERRLA, conjunctiva and sclera clear  ENT: Moist mucous membranes  NECK: Supple, No JVD  CHEST/LUNG: Clear to auscultation bilaterally; No rales, rhonchi, wheezing, or rubs. Unlabored respirations  HEART: Regular rate and rhythm; No murmurs, rubs, or gallops  ABDOMEN: Bowel sounds present; insulin implant in place on RUQ, glucose monitor in place in LUQ, Soft, epigastric tenderness to palpation, Nondistended. No hepatosplenomegaly; intrathecal pump site dressed, clean and dry at low back region  EXTREMITIES:  2+ Peripheral Pulses, brisk capillary refill. No clubbing, cyanosis, or edema  NERVOUS SYSTEM:  Alert & Oriented X3, speech clear. No deficits   MSK: FROM all 4 extremities, full and equal strength  SKIN: No rashes or lesions

## 2021-10-18 NOTE — H&P ADULT - PROBLEM SELECTOR PLAN 4
Pt with chronic abdominal pain due to chronic pancreatitis and gastritis. Epigastric pain at baseline. Intrathecal pump was continued and trialed at [facility] 10/11 -- pt now S/P implant 10/18  Plan:  - Cont home gabapentin 300mg PO TID (as tolerated)  - Cont pantoprazole 40mg IV daily (therapeutic interchange for home nexium)  - Cont home celecoxib 100mg PO BID PRN mild pain

## 2021-10-18 NOTE — H&P ADULT - PROBLEM SELECTOR PLAN 8
F: 100cc/hr normal saline overnight  E: Replete K<4, Mg<2  N: Consistent carbohydrate  D: clears    Code: Full  Dispo: [facility]

## 2021-10-18 NOTE — H&P ADULT - NSICDXPASTMEDICALHX_GEN_ALL_CORE_FT
PAST MEDICAL HISTORY:  David's disease     Chronic nausea     Diabetes     Gastroparesis     GERD (gastroesophageal reflux disease)     History of arthritis     History of chronic gastritis     History of chronic pancreatitis     HTN (hypertension)     Nonalcoholic fatty liver disease      PAST MEDICAL HISTORY:  David's disease     Chronic nausea     Diabetes     Gastroparesis     GERD (gastroesophageal reflux disease)     History of arthritis     History of chronic gastritis     History of chronic pancreatitis     HTN (hypertension)     HTN (hypertension)     Nonalcoholic fatty liver disease

## 2021-10-18 NOTE — H&P ADULT - NSHPREVIEWOFSYSTEMS_GEN_ALL_CORE
CONSTITUTIONAL: No weakness, fevers or chills  EYES/ENT: No visual changes;  No vertigo or throat pain   NECK: No pain or stiffness  RESPIRATORY: No cough, wheezing, hemoptysis; No shortness of breath  CARDIOVASCULAR: No chest pain or palpitations  GASTROINTESTINAL: as above.  GENITOURINARY: No dysuria, frequency or hematuria  NEUROLOGICAL: No numbness or weakness  SKIN: No itching, burning, rashes, or lesions   All other review of systems is negative unless indicated above.

## 2021-10-18 NOTE — H&P ADULT - PROBLEM SELECTOR PLAN 2
Pt has hx of Huntington's disease, on home regimen of 20 mg AM and 5 mg PM. Pt recommended surgical management as below  - Continue solucortef 50mg IV q8hr for 24hrs (to 1:00 PM 10/19), then double home dose, 40mg AM and 10mg PM, then return to home dose 10/20, per outpatient endocrinologist for Huntington's surgical management Pt has hx of [place]'s disease, on home regimen of 20 mg AM and 5 mg PM. Pt recommended surgical management as below  - Continue solucortef 50mg IV q8hr for 24hrs (to 1:00 PM 10/19), then double home dose, 40mg AM and 10mg PM, then return to home dose 10/20, per outpatient endocrinologist for [place]'s surgical management  - Continue prednisone Pt has hx of [place]'s disease, on home regimen of 20 mg AM and 5 mg PM. Pt recommended surgical management as below  - Continue solucortef 50mg IV q8hr for 24hrs (to 1:00 PM 10/19), then double home dose, 40mg AM and 10mg PM, then return to home dose 10/20, per outpatient endocrinologist for [place]'s surgical management  - Continue prednisone 4 mg daily

## 2021-10-18 NOTE — H&P ADULT - TIME BILLING
coordinated patient care with housestaff; answered patient's questions/ concerns spoke to Dr Torrez; coordinated patient care with housestaff; answered patient's questions/ concerns

## 2021-10-18 NOTE — H&P ADULT - ASSESSMENT
52 y/o obese F with PMH of leah's disease, bile reflux with chronic nausea, Hx of pancreatitis 2/2 hypertriglyceridemia (2005) and ERCP induced pancreatitis (2015), pancreatic cysts, gastroparesis, chronic diarrhea, diabetes and HTN, pt is being admitted with progressively worsening nausea and vomiting following intrathecal pump placement likely in the setting of anesthesia exposure vs chronic conditions.

## 2021-10-18 NOTE — H&P ADULT - PROBLEM SELECTOR PLAN 7
F: none  E: Replete K<4, Mg<2  N: Consistent carbohydrate  D    Code: Full  Dispo: [facility] Home regimen: vascepa, lipitor 40mg, ezetimide 10mg   Plan:  - Cont home Lipitor 40mg

## 2021-10-18 NOTE — H&P ADULT - HISTORY OF PRESENT ILLNESS
52yo F with PMH of leah's disease, GERD with chronic nausea, Hx of pancreatitis 2/2 hypertriglyceridemia (2005) and ERCP induced pancreatitis (2015), diabetes, htn, obesity,   50yo obese F with PMH of leah's disease, bile reflux with chronic nausea, Hx of pancreatitis 2/2 hypertriglyceridemia (2005) and ERCP induced pancreatitis (2015), pancreatic cysts, gastroparesis, chronic diarrhea, diabetes and HTN, presents with progressively worsening nausea and vomiting x . Pt was recently admitted following an intrathecal pain pump 10/5-10/7 with complains of non-retractable N/V, treated with Erythromycin compazine and ativan during admission. Pt states that following discharge, she was admitted to [facility] the following weekend for similar complaints which she again was given, Erythromycin compazine and ativan. Pt reports baseline epigastric pain that is unchanged from her typical abdominal pain. She states that she has found that erythromycin has improved motility she continues to have bilious vomiting at home, she is able to continue PO intake. [place] foods such as toast has helped to settle stomach following vomiting. Pt denies fever, chills, recent sick contacts, recent travel, recent introduction of new foods, melena, hematochezia.    While in PACU:   Vitals: T: 97.6 F, HR: 91, /56, RR 12 @96% RA  Labs: pending  Imaging: SP OR flouroscopy, read pending     50yo obese F with PMH of leah's disease, bile reflux with chronic nausea, Hx of pancreatitis 2/2 hypertriglyceridemia (2005) and ERCP induced pancreatitis (2015), pancreatic cysts, gastroparesis, chronic diarrhea, diabetes and HTN, pt is being admitted with progressively worsening nausea and vomiting following intrathecal pump placement likely in the setting of anesthesia exposure vs chronic conditions. Pt was recently admitted following an intrathecal pain pump catheter placement on10/5-10/7 complicated by non-retractable N/V, treated with Erythromycin, compazine and ativan during admission. Pt states that following discharge, overnight, she went to [facility] and was admitted again 10/8-10/10 for similar complaints which she again was given, Erythromycin, compazine and ativan. On 10/11, pt presented to [facility] in which her intrathecal pump was trialed and today was admitted for implantation of intrathecal pump. Pt reports baseline epigastric pain that is unchanged from her typical abdominal pain. She states that she has found that erythromycin has improved motility she continues to have bilious vomiting at home, she is able to continue PO intake. [place] foods such as toast has helped to settle stomach following vomiting. Pt denies fever, chills, recent sick contacts, recent travel, recent introduction of new foods, melena, hematochezia.    While in PACU:   Vitals: T: 97.6 F, HR: 91, /56, RR 12 @96% RA  Labs: pending  Imaging: SP OR flouroscopy, read pending     52yo obese F with PMH of leah's disease, bile reflux with chronic nausea, Hx of pancreatitis 2/2 hypertriglyceridemia (2005) and ERCP induced pancreatitis (2015), pancreatic cysts, gastroparesis, chronic diarrhea, diabetes and HTN, pt is being admitted with progressively worsening nausea and vomiting following intrathecal pump placement likely in the setting of anesthesia exposure vs chronic conditions. Pt was recently admitted following an intrathecal pain pump catheter placement on10/5-10/7 complicated by non-retractable N/V, treated with Erythromycin, compazine and ativan during admission. Pt states that following discharge, overnight, she went to [facility] and was admitted again 10/8-10/10 for similar complaints which she again was given, Erythromycin, compazine and ativan. On 10/11, pt presented to [facility] in which her intrathecal pump was trialed and today was admitted for implantation of intrathecal pump. Pt reports baseline epigastric pain that is unchanged from her typical abdominal pain. She states that she has found that erythromycin has improved motility she continues to have bilious vomiting at home, 4-5 episodes  she is able to continue PO intake. [place] foods such as toast has helped to settle stomach following vomiting. Pt denies fever, chills, recent sick contacts, recent travel, recent introduction of new foods, melena, hematochezia.

## 2021-10-18 NOTE — H&P ADULT - PROBLEM SELECTOR PLAN 1
Pt was recently admitted on 10/5-10/7 following intrathecal pump catheter in which she developed non-retractable bilious vomiting following anesthesia, post-procedure. At that time, nausea was managed with Erythromycin, ativan and compazine. Pt discharged and returned to [facility]'s ED on 10/8-1010 with similar complaints.   Plan:  - Continue ativan 0.5 IV push PRN  - Continue Erythromycin 250mg q8hr  - Continue zofran 8mg TID PRN  - Continue solucortef 50mg IV  q8hr for 24hrs (to 1:00 PM 10/19), then double home dose, 40mg AM and 10mg PM, then return to home dose 10/20, per outpatient endocrinologist for [place]'s surgical management  - Monitor QTc Pt was recently admitted on 10/5-10/7 following intrathecal pump catheter in which she developed non-retractable bilious vomiting following anesthesia, post-procedure. At that time, nausea was managed with Erythromycin, ativan and compazine. Pt discharged and returned to [facility]'s ED on 10/8-1010 with similar complaints.   Plan:  - Continue Tigan 200mg IV q8h PRN nausea/vomiting   - Continue Erythromycin 500 mg TID q8h PRN nausea/vomiting  - Continue Ativan 0.5mg IV q6h PRN breakthrough nausea/vomiting  - Continue solucortef 50mg IV  q8hr for 24hrs (to 1:00 PM 10/19), then double home dose, 40mg AM and 10mg PM, then return to home dose 10/20, per outpatient endocrinologist for [place]'s surgical management  - Monitor QTc

## 2021-10-18 NOTE — PACU DISCHARGE NOTE - COMMENTS
s/p intrathecal pump and catheter implantation, 2 incision site dressed with aquacell. Pt has continuous insulin pump with external monitor.  Vital Signs Stable. Pt is A&Ox4 and fully functional. Pt has chronic nausea because of [place]'s disease. Nausea controlled with 0.5mg of ativan.     @   Given report to agnieszka NOBLE.

## 2021-10-18 NOTE — H&P ADULT - PROBLEM SELECTOR PLAN 6
Pt reports history of cough variant asthma, takes montelukast at home.  Plan:  - Cont home montelukast 10mg PO daily.

## 2021-10-18 NOTE — H&P ADULT - NSHPLABSRESULTS_GEN_ALL_CORE
.  LABS:                     RADIOLOGY, EKG & ADDITIONAL TESTS: Reviewed. RADIOLOGY, EKG & ADDITIONAL TESTS: Reviewed.     -CT A/P (10/6) notable soft tissue encasement of the celiac, superior mesenteric, right hepatic arteries. Soft tissue encasement versus adenopathy with narrowing versus extrinsic compression of the main portal vein.  -CT imaging findings also consistent with MRCP results from [facility], dated 7/12/2021, noting perivascular/retroperitoneal enhancing soft tissue, with unchanged 2 cm segment of portal vein narrowing, likely resolving post-procedural fibrosis. Patent hepatic vessels  -CT A/P (1/27/21 @ [facility]) also noting similar findings post complication of celiac plexus neurolysis/block, reporting infiltration surrounding the celiac axis in the region of the celiac plexus which could be related to postoperative change. Also with mild circumferential fluid/infiltration around the common hepatic artery and extending toward the ta hepatis. No drainable abscess collection is seen in this region. Other vasculature is patent, there may be slight narrowing of the hepatic artery at the level of circumferential fluid encasement.                RADIOLOGY, EKG & ADDITIONAL TESTS: Reviewed. LABS:                         13.5   10.25 )-----------( 386      ( 18 Oct 2021 18:58 )             41.6     10-18    141  |  103  |  14  ----------------------------<  154<H>  4.3   |  25  |  0.71    Ca    9.3      18 Oct 2021 18:58  Phos  3.8     10-18  Mg     1.7     10-18    TPro  6.7  /  Alb  4.0  /  TBili  0.2  /  DBili  x   /  AST  18  /  ALT  37  /  AlkPhos  71  10-18      RADIOLOGY, EKG & ADDITIONAL TESTS: Reviewed.     -CT A/P (10/6) notable soft tissue encasement of the celiac, superior mesenteric, right hepatic arteries. Soft tissue encasement versus adenopathy with narrowing versus extrinsic compression of the main portal vein.  -CT imaging findings also consistent with MRCP results from [facility], dated 7/12/2021, noting perivascular/retroperitoneal enhancing soft tissue, with unchanged 2 cm segment of portal vein narrowing, likely resolving post-procedural fibrosis. Patent hepatic vessels  -CT A/P (1/27/21 @ [facility]) also noting similar findings post complication of celiac plexus neurolysis/block, reporting infiltration surrounding the celiac axis in the region of the celiac plexus which could be related to postoperative change. Also with mild circumferential fluid/infiltration around the common hepatic artery and extending toward the ta hepatis. No drainable abscess collection is seen in this region. Other vasculature is patent, there may be slight narrowing of the hepatic artery at the level of circumferential fluid encasement.

## 2021-10-19 ENCOUNTER — TRANSCRIPTION ENCOUNTER (OUTPATIENT)
Age: 51
End: 2021-10-19

## 2021-10-19 LAB
ANION GAP SERPL CALC-SCNC: 10 MMOL/L — SIGNIFICANT CHANGE UP (ref 5–17)
BUN SERPL-MCNC: 16 MG/DL — SIGNIFICANT CHANGE UP (ref 7–23)
CALCIUM SERPL-MCNC: 9.3 MG/DL — SIGNIFICANT CHANGE UP (ref 8.4–10.5)
CHLORIDE SERPL-SCNC: 104 MMOL/L — SIGNIFICANT CHANGE UP (ref 96–108)
CO2 SERPL-SCNC: 26 MMOL/L — SIGNIFICANT CHANGE UP (ref 22–31)
COVID-19 SPIKE DOMAIN AB INTERP: POSITIVE
COVID-19 SPIKE DOMAIN ANTIBODY RESULT: >250 U/ML — HIGH
CREAT SERPL-MCNC: 0.7 MG/DL — SIGNIFICANT CHANGE UP (ref 0.5–1.3)
GLUCOSE BLDC GLUCOMTR-MCNC: 118 MG/DL — HIGH (ref 70–99)
GLUCOSE BLDC GLUCOMTR-MCNC: 136 MG/DL — HIGH (ref 70–99)
GLUCOSE BLDC GLUCOMTR-MCNC: 137 MG/DL — HIGH (ref 70–99)
GLUCOSE BLDC GLUCOMTR-MCNC: 141 MG/DL — HIGH (ref 70–99)
GLUCOSE BLDC GLUCOMTR-MCNC: 154 MG/DL — HIGH (ref 70–99)
GLUCOSE SERPL-MCNC: 145 MG/DL — HIGH (ref 70–99)
HCT VFR BLD CALC: 42.2 % — SIGNIFICANT CHANGE UP (ref 34.5–45)
HGB BLD-MCNC: 13.5 G/DL — SIGNIFICANT CHANGE UP (ref 11.5–15.5)
MAGNESIUM SERPL-MCNC: 2 MG/DL — SIGNIFICANT CHANGE UP (ref 1.6–2.6)
MCHC RBC-ENTMCNC: 30 PG — SIGNIFICANT CHANGE UP (ref 27–34)
MCHC RBC-ENTMCNC: 32 GM/DL — SIGNIFICANT CHANGE UP (ref 32–36)
MCV RBC AUTO: 93.8 FL — SIGNIFICANT CHANGE UP (ref 80–100)
NRBC # BLD: 0 /100 WBCS — SIGNIFICANT CHANGE UP (ref 0–0)
PHOSPHATE SERPL-MCNC: 4.2 MG/DL — SIGNIFICANT CHANGE UP (ref 2.5–4.5)
PLATELET # BLD AUTO: 398 K/UL — SIGNIFICANT CHANGE UP (ref 150–400)
POTASSIUM SERPL-MCNC: 4.2 MMOL/L — SIGNIFICANT CHANGE UP (ref 3.5–5.3)
POTASSIUM SERPL-SCNC: 4.2 MMOL/L — SIGNIFICANT CHANGE UP (ref 3.5–5.3)
RBC # BLD: 4.5 M/UL — SIGNIFICANT CHANGE UP (ref 3.8–5.2)
RBC # FLD: 13.9 % — SIGNIFICANT CHANGE UP (ref 10.3–14.5)
SARS-COV-2 IGG+IGM SERPL QL IA: >250 U/ML — HIGH
SARS-COV-2 IGG+IGM SERPL QL IA: POSITIVE
SODIUM SERPL-SCNC: 140 MMOL/L — SIGNIFICANT CHANGE UP (ref 135–145)
WBC # BLD: 13.61 K/UL — HIGH (ref 3.8–10.5)
WBC # FLD AUTO: 13.61 K/UL — HIGH (ref 3.8–10.5)

## 2021-10-19 PROCEDURE — 99233 SBSQ HOSP IP/OBS HIGH 50: CPT | Mod: GC

## 2021-10-19 RX ORDER — HYDROCORTISONE 20 MG
40 TABLET ORAL ONCE
Refills: 0 | Status: COMPLETED | OUTPATIENT
Start: 2021-10-20 | End: 2021-10-20

## 2021-10-19 RX ORDER — ERYTHROMYCIN ETHYLSUCCINATE 400 MG
500 TABLET ORAL EVERY 6 HOURS
Refills: 0 | Status: DISCONTINUED | OUTPATIENT
Start: 2021-10-19 | End: 2021-10-21

## 2021-10-19 RX ORDER — HYDROCORTISONE 20 MG
50 TABLET ORAL EVERY 8 HOURS
Refills: 0 | Status: COMPLETED | OUTPATIENT
Start: 2021-10-19 | End: 2021-10-19

## 2021-10-19 RX ORDER — HYDROCORTISONE 20 MG
10 TABLET ORAL ONCE
Refills: 0 | Status: COMPLETED | OUTPATIENT
Start: 2021-10-20 | End: 2021-10-20

## 2021-10-19 RX ORDER — DIPHENHYDRAMINE HCL 50 MG
25 CAPSULE ORAL ONCE
Refills: 0 | Status: COMPLETED | OUTPATIENT
Start: 2021-10-19 | End: 2021-10-19

## 2021-10-19 RX ORDER — LORATADINE 10 MG/1
20 TABLET ORAL EVERY 24 HOURS
Refills: 0 | Status: DISCONTINUED | OUTPATIENT
Start: 2021-10-19 | End: 2021-10-21

## 2021-10-19 RX ADMIN — Medication 200 MILLIGRAM(S): at 07:22

## 2021-10-19 RX ADMIN — GABAPENTIN 300 MILLIGRAM(S): 400 CAPSULE ORAL at 09:18

## 2021-10-19 RX ADMIN — MONTELUKAST 10 MILLIGRAM(S): 4 TABLET, CHEWABLE ORAL at 12:16

## 2021-10-19 RX ADMIN — Medication 25 MILLIGRAM(S): at 02:15

## 2021-10-19 RX ADMIN — ATORVASTATIN CALCIUM 40 MILLIGRAM(S): 80 TABLET, FILM COATED ORAL at 21:46

## 2021-10-19 RX ADMIN — Medication 500 MILLIGRAM(S): at 15:11

## 2021-10-19 RX ADMIN — Medication 50 MILLIGRAM(S): at 12:16

## 2021-10-19 RX ADMIN — FAMOTIDINE 20 MILLIGRAM(S): 10 INJECTION INTRAVENOUS at 09:18

## 2021-10-19 RX ADMIN — Medication 4 MILLIGRAM(S): at 09:18

## 2021-10-19 RX ADMIN — Medication 0.5 MILLIGRAM(S): at 22:23

## 2021-10-19 RX ADMIN — Medication 500 MILLIGRAM(S): at 01:52

## 2021-10-19 RX ADMIN — LORATADINE 20 MILLIGRAM(S): 10 TABLET ORAL at 18:58

## 2021-10-19 RX ADMIN — Medication 200 MILLIGRAM(S): at 21:49

## 2021-10-19 RX ADMIN — GABAPENTIN 300 MILLIGRAM(S): 400 CAPSULE ORAL at 17:56

## 2021-10-19 RX ADMIN — Medication 500 MILLIGRAM(S): at 09:19

## 2021-10-19 RX ADMIN — FAMOTIDINE 20 MILLIGRAM(S): 10 INJECTION INTRAVENOUS at 21:46

## 2021-10-19 RX ADMIN — CELECOXIB 100 MILLIGRAM(S): 200 CAPSULE ORAL at 22:20

## 2021-10-19 RX ADMIN — Medication 0.5 MILLIGRAM(S): at 10:45

## 2021-10-19 RX ADMIN — CELECOXIB 100 MILLIGRAM(S): 200 CAPSULE ORAL at 10:29

## 2021-10-19 RX ADMIN — Medication 0.5 MILLIGRAM(S): at 00:28

## 2021-10-19 RX ADMIN — Medication 500 MILLIGRAM(S): at 19:03

## 2021-10-19 RX ADMIN — Medication 50 MILLIGRAM(S): at 21:47

## 2021-10-19 RX ADMIN — Medication 25 MILLIGRAM(S): at 15:11

## 2021-10-19 RX ADMIN — Medication 50 MILLIGRAM(S): at 06:45

## 2021-10-19 RX ADMIN — CELECOXIB 100 MILLIGRAM(S): 200 CAPSULE ORAL at 09:18

## 2021-10-19 RX ADMIN — GABAPENTIN 300 MILLIGRAM(S): 400 CAPSULE ORAL at 21:46

## 2021-10-19 RX ADMIN — PANTOPRAZOLE SODIUM 40 MILLIGRAM(S): 20 TABLET, DELAYED RELEASE ORAL at 12:16

## 2021-10-19 RX ADMIN — CELECOXIB 100 MILLIGRAM(S): 200 CAPSULE ORAL at 21:46

## 2021-10-19 NOTE — PROGRESS NOTE ADULT - PROBLEM SELECTOR PLAN 1
Pt was recently admitted on 10/5-10/7 following intrathecal pump catheter in which she developed non-retractable bilious vomiting following anesthesia, post-procedure. Patient's nausea has been well controlled with a combination of Erythromycin, Tigan, Ativan, and Compazine in the past.   Plan (in coordination with outpatient endocrinologist recommendations)   - Continue Tigan 200mg IV q8h PRN nausea/vomiting   - Continue Erythromycin 500 mg TID q8h PRN nausea/vomiting  - Continue Ativan 0.5mg IV q6h PRN breakthrough nausea/vomiting  - Continue Solucortef 50mg IV  q8hr for 24hrs (to 1:00 PM 10/19), then double home dose, 40mg AM and 10mg PM, then return to home dose 10/20, per outpatient endocrinologist for [place]'s surgical management  - Thus far, patient's nausea and vomiting has improved and she has been tolerating PO clear liquid diet.   - Assess if patient is able to tolerate liquid diet --> solid diet; if able, patient can be discharged Pt was recently admitted on 10/5-10/7 following intrathecal pump catheter in which she developed non-retractable bilious vomiting following anesthesia, post-procedure. Patient's nausea has been well controlled with a combination of Erythromycin, Tigan, Ativan, and Compazine in the past.   Plan (in coordination with outpatient endocrinologist recommendations)   - Continue Tigan 200mg IV q8h PRN nausea/vomiting   - Continue Erythromycin 500 mg TID q8h PRN nausea/vomiting  - Continue Ativan 0.5mg IV q6h PRN breakthrough nausea/vomiting  - [facility] then double home dose, 40mg AM and 10mg PM tomorrow, then return to home dose 10/21, per outpatient endocrinologist for [place]'s surgical management  - Thus far, patient's nausea and vomiting has improved and she has been tolerating PO clear liquid diet.   - Assess if patient is able to tolerate liquid diet --> solid diet; if able, patient can be discharged Pt was recently admitted on 10/5-10/7 following intrathecal pump catheter in which she developed non-retractable bilious vomiting following anesthesia, post-procedure. Patient's nausea has been well controlled with a combination of Erythromycin, Tigan, Ativan, and Compazine in the past.   Plan (in coordination with outpatient endocrinologist recommendations)   - Continue Tigan 200mg IV q8h PRN nausea/vomiting   - Continue Erythromycin 500 mg TID q8h PRN nausea/vomiting  - Continue Ativan 0.5mg IV q6h PRN breakthrough nausea/vomiting  - Continue Solucortef 50mg IV q8hr through 9PM, then double home dose (to 40mg AM and 10mg PM) tomorrow, per outpatient endocrinologist for [place]'s surgical management  - Thus far, patient's nausea and vomiting has improved and she has been tolerating PO clear liquid diet.   - Assess if patient is able to tolerate liquid diet to solid diet; if able, patient can be discharged

## 2021-10-19 NOTE — DISCHARGE NOTE PROVIDER - NSDCMRMEDTOKEN_GEN_ALL_CORE_FT
Bystolic 2.5 mg oral tablet: 1 tab(s) orally once a day  Carafate 1 g oral tablet: 1 tab(s) orally 4 times a day (before meals and at bedtime)  CeleBREX 100 mg oral capsule: 1 cap(s) orally 2 times a day  cholestyramine 4 g/5 g oral powder for reconstitution:   erythromycin 250 mg oral delayed release tablet: 2 tab(s) orally every 8 hours, As needed, nausea/vomiting  ezetimibe 10 mg oral tablet: 1 tab(s) orally once a day  famotidine 20 mg oral tablet: 1 tab(s) orally 2 times a day  HumaLOG 100 units/mL injectable solution:   hydrocortisone 20 mg oral tablet: milligram(s) orally once a day (in the morning)  hydrocortisone 5 mg oral tablet: orally once a day (in the evening)  HYDROmorphone 4 mg oral tablet: 1 tab(s) orally every 6 hours  Invokana 300 mg oral tablet: 1 tab(s) orally once a day  Lipitor 40 mg oral tablet: 1 tab(s) orally once a day  metFORMIN 1000 mg oral tablet: 1 tab(s) orally 2 times a day  Neurontin 300 mg oral capsule: 1 cap(s) orally 3 times a day  NexIUM 40 mg oral delayed release capsule: 1 cap(s) orally 2 times a day  Ozempic 2 mg/1.5 mL (0.25 mg or 0.5 mg dose) subcutaneous solution: subcutaneous once a week  predniSONE 2 mg oral delayed release tablet: 4 milligram(s) orally once a day  prochlorperazine 5 mg oral tablet: 2 tab(s) orally every 8 hours   Singulair 10 mg oral tablet: 1 tab(s) orally once a day  Vascepa 1 g oral capsule: 2 cap(s) orally 2 times a day  Vitamin D3 50 mcg (2000 intl units) oral capsule: 1 cap(s) orally once a day  Voltaren:   Zofran ODT 8 mg oral tablet, disintegratin tab(s) orally 3 times a day, As Needed   Bystolic 2.5 mg oral tablet: 1 tab(s) orally once a day  Carafate 1 g oral tablet: 1 tab(s) orally 4 times a day (before meals and at bedtime)  CeleBREX 100 mg oral capsule: 1 cap(s) orally 2 times a day  cholestyramine 4 g/5 g oral powder for reconstitution:   erythromycin 250 mg oral delayed release tablet: 2 tab(s) orally every 8 hours, As needed, nausea/vomiting  ezetimibe 10 mg oral tablet: 1 tab(s) orally once a day  famotidine 20 mg oral tablet: 1 tab(s) orally 2 times a day  HumaLOG 100 units/mL injectable solution:   hydrocortisone 20 mg oral tablet: milligram(s) orally once a day (in the morning)  hydrocortisone 5 mg oral tablet: orally once a day (in the evening)  HYDROmorphone 4 mg oral tablet: 1 tab(s) orally every 6 hours  Invokana 300 mg oral tablet: 1 tab(s) orally once a day  Lipitor 40 mg oral tablet: 1 tab(s) orally once a day  loratadine 10 mg oral tablet: 2 tab(s) orally every 24 hours  metFORMIN 1000 mg oral tablet: 1 tab(s) orally 2 times a day  Neurontin 300 mg oral capsule: 1 cap(s) orally 3 times a day  NexIUM 40 mg oral delayed release capsule: 1 cap(s) orally 2 times a day  Ozempic 2 mg/1.5 mL (0.25 mg or 0.5 mg dose) subcutaneous solution: subcutaneous once a week  predniSONE 2 mg oral delayed release tablet: 4 milligram(s) orally once a day  prochlorperazine 5 mg oral tablet: 2 tab(s) orally every 8 hours   Singulair 10 mg oral tablet: 1 tab(s) orally once a day  Tigan 300 mg oral capsule: 1 cap(s) orally every 8 hours   Vascepa 1 g oral capsule: 2 cap(s) orally 2 times a day  Vitamin D3 50 mcg (2000 intl units) oral capsule: 1 cap(s) orally once a day  Voltaren:   Zofran ODT 8 mg oral tablet, disintegratin tab(s) orally 3 times a day, As Needed

## 2021-10-19 NOTE — PROGRESS NOTE ADULT - PROBLEM SELECTOR PLAN 3
Care Management Interventions  PCP Verified by CM: Yes  Mode of Transport at Discharge: Other (see comment)  Transition of Care Consult (CM Consult): Other  Current Support Network: Other  Confirm Follow Up Transport: Family  Plan discussed with Pt/Family/Caregiver: Yes  Freedom of Choice Offered: Yes  Discharge Location  Discharge Placement: Home  Chart screened by  for discharge planning. No needs identified at this time. Please consult  if any new issues arise. Pt with hx of diabetes 2/2 chronic pancreatitis. HbA1c 6.3, last admission  Home PO meds: Invokana 300 mg daily, metformin 1000 mg BID when able to tolerate oral   Plan:  - Hold home PO Invokana 300 mg daily, metformin 1000 mg BID while inpatient  - Continue insulin lispro sliding scale   - pt deferred use of holding insulin pump as her endocrinologist had instructed and programmed insulin pump to accommodate stress dose steroids. will c/w insulin pump Pt with hx of diabetes 2/2 chronic pancreatitis. HbA1c 6.3, last admission  Home PO meds: Invokana 300 mg daily, metformin 1000 mg BID when able to tolerate oral   Plan:  - Hold home PO Invokana 300 mg daily, metformin 1000 mg BID while inpatient  - Continue use of insulin pump as her endocrinologist had instructed and programmed insulin pump to accommodate stress dose steroids (as per endocrinologist instructions)

## 2021-10-19 NOTE — PROGRESS NOTE ADULT - SUBJECTIVE AND OBJECTIVE BOX
*incomplete*    SUBJECTIVE:   Patient seen and examined at bedside.    OBJECTIVE:    VITAL SIGNS:  ICU Vital Signs Last 24 Hrs  T(C): 37 (19 Oct 2021 06:23), Max: 37.1 (18 Oct 2021 20:51)  T(F): 98.6 (19 Oct 2021 06:23), Max: 98.7 (18 Oct 2021 20:51)  HR: 96 (19 Oct 2021 06:23) (89 - 100)  BP: 148/83 (19 Oct 2021 06:23) (108/53 - 148/83)  BP(mean): 88 (18 Oct 2021 19:07) (76 - 88)  ABP: --  ABP(mean): --  RR: 19 (19 Oct 2021 06:23) (12 - 22)  SpO2: 95% (19 Oct 2021 06:23) (94% - 100%)        CAPILLARY BLOOD GLUCOSE      POCT Blood Glucose.: 136 mg/dL (19 Oct 2021 09:30)      PHYSICAL EXAM:         MEDICATIONS:  MEDICATIONS  (STANDING):  atorvastatin 40 milliGRAM(s) Oral at bedtime  celecoxib 100 milliGRAM(s) Oral every 12 hours  dextrose 40% Gel 15 Gram(s) Oral once  dextrose 5%. 1000 milliLiter(s) (50 mL/Hr) IV Continuous <Continuous>  dextrose 5%. 1000 milliLiter(s) (100 mL/Hr) IV Continuous <Continuous>  dextrose 50% Injectable 25 Gram(s) IV Push once  dextrose 50% Injectable 12.5 Gram(s) IV Push once  dextrose 50% Injectable 25 Gram(s) IV Push once  erythromycin     enteric coated 500 milliGRAM(s) Oral every 6 hours  famotidine    Tablet 20 milliGRAM(s) Oral two times a day  gabapentin 300 milliGRAM(s) Oral every 8 hours  glucagon  Injectable 1 milliGRAM(s) IntraMuscular once  hydrocortisone sodium succinate Injectable 50 milliGRAM(s) IV Push every 8 hours  influenza   Vaccine 0.5 milliLiter(s) IntraMuscular once  insulin lispro (ADMELOG) corrective regimen sliding scale   SubCutaneous three times a day before meals  montelukast 10 milliGRAM(s) Oral daily  pantoprazole  Injectable 40 milliGRAM(s) IV Push daily  predniSONE   Tablet 4 milliGRAM(s) Oral daily  sodium chloride 0.9%. 1000 milliLiter(s) (100 mL/Hr) IV Continuous <Continuous>  sucralfate 1 Gram(s) Oral every 6 hours    MEDICATIONS  (PRN):  LORazepam   Injectable 0.5 milliGRAM(s) IV Push every 8 hours PRN Nausea and/or Vomiting  trimethobenzamide Injectable 200 milliGRAM(s) IntraMuscular every 8 hours PRN Nausea and/or Vomiting      ALLERGIES:  Allergies    amoxicillin (Unknown)  penicillin (Unknown)  Reglan (Unknown)  Topamax (Unknown)    Intolerances    pt intolerant to ETOH due to history of pancreatitis (Unknown)  Tylenol (Unknown)      LABS:                        13.5   13.61 )-----------( 398      ( 19 Oct 2021 07:11 )             42.2     10-19    140  |  104  |  16  ----------------------------<  145<H>  4.2   |  26  |  0.70    Ca    9.3      19 Oct 2021 07:11  Phos  4.2     10-19  Mg     2.0     10-19    TPro  6.7  /  Alb  4.0  /  TBili  0.2  /  DBili  x   /  AST  18  /  ALT  37  /  AlkPhos  71  10-18          RADIOLOGY & ADDITIONAL TESTS: Reviewed. SUBJECTIVE:   Patient seen and examined at bedside. Patient notes that her pain and nausea/vomiting have improved since last night and she is able to tolerate clear liquid diet.     OBJECTIVE:    VITAL SIGNS:  ICU Vital Signs Last 24 Hrs  T(C): 37 (19 Oct 2021 06:23), Max: 37.1 (18 Oct 2021 20:51)  T(F): 98.6 (19 Oct 2021 06:23), Max: 98.7 (18 Oct 2021 20:51)  HR: 96 (19 Oct 2021 06:23) (89 - 100)  BP: 148/83 (19 Oct 2021 06:23) (108/53 - 148/83)  BP(mean): 88 (18 Oct 2021 19:07) (76 - 88)  RR: 19 (19 Oct 2021 06:23) (12 - 22)  SpO2: 95% (19 Oct 2021 06:23) (94% - 100%)        CAPILLARY BLOOD GLUCOSE      POCT Blood Glucose.: 136 mg/dL (19 Oct 2021 09:30)      PHYSICAL EXAM:  GENERAL: NAD, lying in bed comfortably  HEENT:  Atraumatic, Normocephalic, EOMI, conjunctiva and sclera clear  NECK: Supple, No JVD  CHEST/LUNG: Clear to auscultation bilaterally; No rales, rhonchi, wheezing, or rubs.    HEART: Regular rate and rhythm; No murmurs, rubs, or gallops  ABDOMEN: Bowel sounds present; insulin implant in place on RUQ, glucose monitor in place in LUQ, Soft, epigastric tenderness to palpation, Nondistended. No hepatosplenomegaly; intrathecal pump site dressed, clean and dry at lower left back/flank region   EXTREMITIES:  2+ Peripheral Pulses, No clubbing, cyanosis, or edema  NERVOUS SYSTEM: Alert & Oriented X3  SKIN: red flushing on face        MEDICATIONS:  MEDICATIONS  (STANDING):  atorvastatin 40 milliGRAM(s) Oral at bedtime  celecoxib 100 milliGRAM(s) Oral every 12 hours  dextrose 40% Gel 15 Gram(s) Oral once  dextrose 5%. 1000 milliLiter(s) (50 mL/Hr) IV Continuous <Continuous>  dextrose 5%. 1000 milliLiter(s) (100 mL/Hr) IV Continuous <Continuous>  dextrose 50% Injectable 25 Gram(s) IV Push once  dextrose 50% Injectable 12.5 Gram(s) IV Push once  dextrose 50% Injectable 25 Gram(s) IV Push once  erythromycin     enteric coated 500 milliGRAM(s) Oral every 6 hours  famotidine    Tablet 20 milliGRAM(s) Oral two times a day  gabapentin 300 milliGRAM(s) Oral every 8 hours  glucagon  Injectable 1 milliGRAM(s) IntraMuscular once  hydrocortisone sodium succinate Injectable 50 milliGRAM(s) IV Push every 8 hours  influenza   Vaccine 0.5 milliLiter(s) IntraMuscular once  insulin lispro (ADMELOG) corrective regimen sliding scale   SubCutaneous three times a day before meals  montelukast 10 milliGRAM(s) Oral daily  pantoprazole  Injectable 40 milliGRAM(s) IV Push daily  predniSONE   Tablet 4 milliGRAM(s) Oral daily  sodium chloride 0.9%. 1000 milliLiter(s) (100 mL/Hr) IV Continuous <Continuous>  sucralfate 1 Gram(s) Oral every 6 hours    MEDICATIONS  (PRN):  LORazepam   Injectable 0.5 milliGRAM(s) IV Push every 8 hours PRN Nausea and/or Vomiting  trimethobenzamide Injectable 200 milliGRAM(s) IntraMuscular every 8 hours PRN Nausea and/or Vomiting      ALLERGIES:  Allergies    amoxicillin (Unknown)  penicillin (Unknown)  Reglan (Unknown)  Topamax (Unknown)    Intolerances    pt intolerant to ETOH due to history of pancreatitis (Unknown)  Tylenol (Unknown)      LABS:                        13.5   13.61 )-----------( 398      ( 19 Oct 2021 07:11 )             42.2     10-19    140  |  104  |  16  ----------------------------<  145<H>  4.2   |  26  |  0.70    Ca    9.3      19 Oct 2021 07:11  Phos  4.2     10-19  Mg     2.0     10-19    TPro  6.7  /  Alb  4.0  /  TBili  0.2  /  DBili  x   /  AST  18  /  ALT  37  /  AlkPhos  71  10-18          RADIOLOGY & ADDITIONAL TESTS: Reviewed.   HOSPITAL COURSE: Ms. Garcia is a 50yo F with a PMH of David's disease, DM, bile reflux with chronic nausea, severe acute pancreatitis (2005), pancreatic cysts, gastroparesis, and chronic pain, presenting with progressively worsening nausea and vomiting following intrathecal pump placement, in the setting of anesthesia exposure vs. chronic conditions, admitted for chronic nausea and vomiting management. Patient was continued on her previous anti-nausea regimen of tigan, erythromycin, ativan, and was also started on solucortef (at a special dose given marisol-procedural) for her david's disease. Nausea and vomiting has much improved, patient tolerating clear liquid diet. Plan to D/C once tolerating PO solid foods and n/v resolved.    SUBJECTIVE:   Patient seen and examined at bedside. Patient notes that her pain and nausea/vomiting have improved since last night and she is able to tolerate clear liquid diet.     OBJECTIVE: NANCY    VITAL SIGNS:  ICU Vital Signs Last 24 Hrs  T(C): 37 (19 Oct 2021 06:23), Max: 37.1 (18 Oct 2021 20:51)  T(F): 98.6 (19 Oct 2021 06:23), Max: 98.7 (18 Oct 2021 20:51)  HR: 96 (19 Oct 2021 06:23) (89 - 100)  BP: 148/83 (19 Oct 2021 06:23) (108/53 - 148/83)  BP(mean): 88 (18 Oct 2021 19:07) (76 - 88)  RR: 19 (19 Oct 2021 06:23) (12 - 22)  SpO2: 95% (19 Oct 2021 06:23) (94% - 100%)        CAPILLARY BLOOD GLUCOSE      POCT Blood Glucose.: 136 mg/dL (19 Oct 2021 09:30)      PHYSICAL EXAM:  GENERAL: NAD, lying in bed comfortably  HEENT:  Atraumatic, Normocephalic, EOMI, conjunctiva and sclera clear  NECK: Supple, No JVD  CHEST/LUNG: Clear to auscultation bilaterally; No rales, rhonchi, wheezing, or rubs.    HEART: Regular rate and rhythm; No murmurs, rubs, or gallops  ABDOMEN: Bowel sounds present; insulin implant in place on RUQ, glucose monitor in place in LUQ, Soft, epigastric tenderness to palpation, Nondistended. No hepatosplenomegaly; intrathecal pump site dressed, clean and dry at lower left back/flank region   EXTREMITIES:  2+ Peripheral Pulses, No clubbing, cyanosis, or edema  NERVOUS SYSTEM: Alert & Oriented X3  SKIN: red flushing on face        MEDICATIONS:  MEDICATIONS  (STANDING):  atorvastatin 40 milliGRAM(s) Oral at bedtime  celecoxib 100 milliGRAM(s) Oral every 12 hours  dextrose 40% Gel 15 Gram(s) Oral once  dextrose 5%. 1000 milliLiter(s) (50 mL/Hr) IV Continuous <Continuous>  dextrose 5%. 1000 milliLiter(s) (100 mL/Hr) IV Continuous <Continuous>  dextrose 50% Injectable 25 Gram(s) IV Push once  dextrose 50% Injectable 12.5 Gram(s) IV Push once  dextrose 50% Injectable 25 Gram(s) IV Push once  erythromycin     enteric coated 500 milliGRAM(s) Oral every 6 hours  famotidine    Tablet 20 milliGRAM(s) Oral two times a day  gabapentin 300 milliGRAM(s) Oral every 8 hours  glucagon  Injectable 1 milliGRAM(s) IntraMuscular once  hydrocortisone sodium succinate Injectable 50 milliGRAM(s) IV Push every 8 hours  influenza   Vaccine 0.5 milliLiter(s) IntraMuscular once  insulin lispro (ADMELOG) corrective regimen sliding scale   SubCutaneous three times a day before meals  montelukast 10 milliGRAM(s) Oral daily  pantoprazole  Injectable 40 milliGRAM(s) IV Push daily  predniSONE   Tablet 4 milliGRAM(s) Oral daily  sodium chloride 0.9%. 1000 milliLiter(s) (100 mL/Hr) IV Continuous <Continuous>  sucralfate 1 Gram(s) Oral every 6 hours    MEDICATIONS  (PRN):  LORazepam   Injectable 0.5 milliGRAM(s) IV Push every 8 hours PRN Nausea and/or Vomiting  trimethobenzamide Injectable 200 milliGRAM(s) IntraMuscular every 8 hours PRN Nausea and/or Vomiting      ALLERGIES:  Allergies    amoxicillin (Unknown)  penicillin (Unknown)  Reglan (Unknown)  Topamax (Unknown)    Intolerances    pt intolerant to ETOH due to history of pancreatitis (Unknown)  Tylenol (Unknown)      LABS:                        13.5   13.61 )-----------( 398      ( 19 Oct 2021 07:11 )             42.2     10-19    140  |  104  |  16  ----------------------------<  145<H>  4.2   |  26  |  0.70    Ca    9.3      19 Oct 2021 07:11  Phos  4.2     10-19  Mg     2.0     10-19    TPro  6.7  /  Alb  4.0  /  TBili  0.2  /  DBili  x   /  AST  18  /  ALT  37  /  AlkPhos  71  10-18          RADIOLOGY & ADDITIONAL TESTS: Reviewed.

## 2021-10-19 NOTE — PROGRESS NOTE ADULT - PROBLEM SELECTOR PLAN 5
Pt with hx of GERD   Plan:  - Cont Protonix 40 mg daily  - Cont Carafate 1g PO QID. Pt with hx of GERD   Plan:  - Cont Protonix 40 mg daily  - Cont Carafate 1g PO QID

## 2021-10-19 NOTE — DISCHARGE NOTE PROVIDER - HOSPITAL COURSE
#Discharge: do not delete    Ms. Garcia is a 50yo F with a PMH of [place]'s disease, DM, bile reflux with chronic nausea, severe acute pancreatitis (2005), pancreatic cysts, gastroparesis, and chronic pain, presenting with progressively worsening nausea and vomiting following intrathecal pump placement, in the setting of anesthesia exposure vs. chronic conditions.     Hospital course (by problem):     #Chronic nausea.   Plan: Pt was recently admitted on 10/5-10/7 following intrathecal pump catheter in which she developed non-retractable bilious vomiting following anesthesia, post-procedure. Patient's nausea has been well controlled with a combination of Erythromycin, Tigan, Ativan, and Compazine in the past.   - Continued Tigan 200mg IV q8h PRN nausea/vomiting   - Continued Erythromycin 500 mg TID q8h PRN nausea/vomiting  - Continued Ativan 0.5mg IV q6h PRN breakthrough nausea/vomiting  - Per outpatient endocrinologist (Dr. Bucky Montoya) for [place]'s surgical management: Started Solucortef 50mg IV q8hr through 9PM, then double home dose (to 40mg AM and 10mg PM) tomorrow  - Patient's nausea and vomiting has improved and she has been tolerating PO clear liquid diet.    #David's disease.   Pt has hx of [place]'s disease, on home regimen of 20 mg AM and 5 mg PM  - follows with endocrinologist outpatient (Dr. Bucky Montoya)   - has a history of addisonian crisis post-procedures   - per Dr. Bucky Montoya recommendations: Continue Solucortef 50mg IV q8hr through 9PM tonight, then double home dose (to 40mg AM and 10mg PM) tomorrow 10/20  - Continued prednisone 4 mg daily.    #Diabetes.   Pt with hx of diabetes 2/2 chronic pancreatitis. HbA1c 6.3, last admission  Home PO meds: Invokana 300 mg daily, metformin 1000 mg BID when able to tolerate oral   Plan:  - Held home PO Invokana 300 mg daily, metformin 1000 mg BID while inpatient  - Continued insulin pump as her endocrinologist had instructed and programmed insulin pump to accommodate stress dose steroids (as per endocrinologist instructions)    #Chronic abdominal pain.   Pt with chronic abdominal pain due to chronic pancreatitis and gastritis. Epigastric pain at baseline. Intrathecal pump was continued and trialed at [facility] 10/11 -- pt now S/P implant 10/18  Plan:  - Continued home gabapentin 300mg PO TID (as tolerated)  - Continued pantoprazole 40mg IV daily (therapeutic interchange for home nexium)  - Continued home celecoxib 100mg PO BID PRN mild pain  - Intra-thecal pump in place and working well for patient (dilaudid).    #GERD (gastroesophageal reflux disease).   Pt with hx of GERD   Plan:  - Continued Protonix 40 mg daily  - Continued Carafate 1g PO QID.    #Asthma.   Pt reports history of cough variant asthma, takes montelukast at home.  Plan:  - Continued home montelukast 10mg PO daily.    #HLD (hyperlipidemia).   Home regimen: vascepa, lipitor 40mg, ezetimide 10mg   Plan:  - Continued home Lipitor 40mg.      Patient was discharged to: (home/[facility]/acute rehab/hospice, etc, and with what services – home health PT/RN? Home O2?)    New medications:   Changes to old medications:  Medications that were stopped:    Items to follow up as outpatient:    Physical exam at the time of discharge:       #Discharge: do not delete    Ms. Garcia is a 52yo F with a PMH of [place]'s disease, DM, bile reflux with chronic nausea, severe acute pancreatitis (2005), pancreatic cysts, gastroparesis, and chronic pain, presenting with progressively worsening nausea and vomiting following intrathecal pump placement, in the setting of anesthesia exposure vs. chronic conditions.     Hospital course (by problem):     #Chronic nausea.   Plan: Pt was recently admitted on 10/5-10/7 following intrathecal pump catheter in which she developed non-retractable bilious vomiting following anesthesia, post-procedure. Patient's nausea has been well controlled with a combination of Erythromycin, Tigan, Ativan, and Compazine in the past.   - Continued Erythromycin 500 mg TID q8h nausea/vomiting  - Continued Tigan 200mg IV q8h PRN nausea/vomiting   - Continued Ativan 0.5mg IV q6h PRN breakthrough nausea/vomiting  - Per outpatient endocrinologist (Dr. Bucky Montoya) for [place]'s surgical management: Started Solucortef 50mg IV q8hr through 9PM, then double home dose (to 40mg AM and 10mg PM)    - Patient's nausea and vomiting has improved and she has been tolerating PO  diet.    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 Bucky Montoya)   - has a history of addisonian crisis post-procedures   - per Dr. Bucky Montoya recommendations: Continue Solucortef 50mg IV q8hr through 9PM first night, then double home dose (to 40mg AM and 10mg PM)   - Continued prednisone 4 mg daily.    #Diabetes.   Pt with hx of diabetes 2/2 chronic pancreatitis. HbA1c 6.3, last admission  Home PO meds: Invokana 300 mg daily, metformin 1000 mg BID when able to tolerate oral   Plan:  - Held home PO Invokana 300 mg daily, metformin 1000 mg BID while inpatient  - Continued insulin pump as her endocrinologist had instructed and programmed insulin pump to accommodate stress dose steroids (as per endocrinologist instructions)    #Chronic abdominal pain.   Pt with chronic abdominal pain due to chronic pancreatitis and gastritis. Epigastric pain at baseline. Intrathecal pump was continued and trialed at [facility] 10/11 -- pt now S/P implant 10/18  Plan:  - Continued home gabapentin 300mg PO TID (as tolerated)  - Continued pantoprazole 40mg IV daily (therapeutic interchange for home nexium)  - Continued home celecoxib 100mg PO BID PRN mild pain  - Intra-thecal pump in place and working well for patient (dilaudid).    #GERD (gastroesophageal reflux disease).   Pt with hx of GERD   Plan:  - Continued Protonix 40 mg daily  - Continued Carafate 1g PO QID  - Famotidine 20mg PO BID     #Asthma.   Pt reports history of cough variant asthma, takes montelukast at home.  Plan:  - Continued home montelukast 10mg PO daily  - Loratadine 20 mg PO daily     #HLD (hyperlipidemia).   Home regimen: vascepa, lipitor 40mg, ezetimide 10mg   Plan:  - Continued home Lipitor 40mg.      Patient was discharged to: (home/[facility]/acute rehab/hospice, etc, and with what services – home health PT/RN? Home O2?)    New medications:   Changes to old medications:  Medications that were stopped:    Items to follow up as outpatient:    Physical exam at the time of discharge:       #Discharge: do not delete    Ms. Garcia is a 50yo F with a PMH of David's disease, DM, bile reflux with chronic nausea, severe acute pancreatitis (2005), pancreatic cysts, gastroparesis, and chronic pain, presenting with progressively worsening nausea and vomiting following intrathecal pump placement, in the setting of anesthesia exposure vs. chronic conditions.     Given that patient's nausea and vomiting has improved and she has been tolerating PO diet and holding down oral medications, patient may be discharged home, with appropriate follow-up care with her outpatient gastroenterologist, endocrinologist, and pain management physician.     Hospital course (by problem):     #Chronic nausea.   Plan: Pt was recently admitted on 10/5-10/7 following intrathecal pump catheter in which she developed non-retractable bilious vomiting following anesthesia, post-procedure. Patient's nausea has been well controlled with a combination of Erythromycin, Tigan, Ativan, and Compazine in the past.   - Continued Erythromycin 500 mg TID q8h nausea/vomiting  - Continued Tigan 200mg IV q8h PRN nausea/vomiting   - Continued Ativan 0.5mg IV q6h PRN breakthrough nausea/vomiting  - Per outpatient endocrinologist (Dr. Bucky Montoya) for [place]'s surgical management: Started Solucortef 50mg IV q8hr through 9PM, then double home dose (to 40mg AM and 10mg PM)    - Patient's nausea and vomiting has improved and she has been tolerating PO diet and oral medications     #[place]'s disease.   Pt has hx of David's disease, on home regimen of 20 mg AM and 5 mg PM  - follows with endocrinologist outpatient (Dr. Bucky Montoya)   - has a history of addisonian crisis post-procedures   - per Dr. Bucky Montoya recommendations: Continue Solucortef 50mg IV q8hr through 9PM first night, then double home dose (to 40mg AM and 10mg PM)   - Continued prednisone 4 mg daily.    #Diabetes.   Pt with hx of diabetes 2/2 chronic pancreatitis. HbA1c 6.3, last admission  Home PO meds: Invokana 300 mg daily, metformin 1000 mg BID when able to tolerate oral   Plan:  - Held home PO Invokana 300 mg daily, metformin 1000 mg BID while inpatient  - Continued insulin pump as her endocrinologist had instructed and programmed insulin pump to accommodate stress dose steroids (as per endocrinologist instructions)  - Glucose levels and diabetes well controlled     #Chronic abdominal pain.   Pt with chronic abdominal pain due to chronic pancreatitis and gastritis. Epigastric pain at baseline. Intrathecal pump was continued and trialed at [facility] 10/11 -- pt now S/P implant 10/18  Plan:  - Continued home gabapentin 300mg PO TID (as tolerated)  - Continued pantoprazole 40mg IV daily (therapeutic interchange for home nexium)  - Continued home celecoxib 100mg PO BID PRN mild pain  - Intra-thecal pump in place and working well for patient (dilaudid).    #GERD (gastroesophageal reflux disease).   Pt with hx of GERD   Plan:  - Continued Protonix 40 mg daily  - Continued Carafate 1g PO QID  - Famotidine 20mg PO BID     #Asthma.   Pt reports history of cough variant asthma, takes montelukast at home.  Plan:  - Continued home montelukast 10mg PO daily  - Loratadine 20 mg PO daily     #HLD (hyperlipidemia).   Home regimen: vascepa, lipitor 40mg, ezetimide 10mg   Plan:  - Continued home Lipitor 40mg.      Patient was discharged to: home     New medications: N/A  Changes to old medications: N/A  Medications that were stopped: N/A     Items to follow up as outpatient:    Physical exam at the time of discharge:  GENERAL: NAD, lying in bed comfortably  HEENT:  Atraumatic, Normocephalic, EOMI, conjunctiva and sclera clear  NECK: Supple, No JVD  CHEST/LUNG: Clear to auscultation bilaterally; No rales, rhonchi, wheezing, or rubs.    HEART: Regular rate and rhythm; No murmurs, rubs, or gallops  ABDOMEN: Bowel sounds present; moderate tenderness to palpation; insulin implant in place on RUQ, glucose monitor in place in LUQ, Soft, epigastric tenderness to palpation, Nondistended. No hepatosplenomegaly; intrathecal pump site dressed, clean and dry at lower left back/flank region   EXTREMITIES:  2+ Peripheral Pulses, No clubbing, cyanosis, or edema  NERVOUS SYSTEM: Alert & Oriented X3     #Discharge: do not delete    Ms. Garcia is a 50yo F with a PMH of David's disease, DM, bile reflux with chronic nausea, severe acute pancreatitis (2005), pancreatic cysts, gastroparesis, and chronic pain, presenting with progressively worsening nausea and vomiting following intrathecal pump placement, in the setting of anesthesia exposure vs. chronic conditions.     Given that patient's nausea and vomiting has improved and she has been tolerating PO diet and holding down oral medications, patient may be discharged home, with appropriate follow-up care with her outpatient gastroenterologist, endocrinologist, and pain management physician.     Hospital course (by problem):     #Chronic nausea.   Plan: Pt was recently admitted on 10/5-10/7 following intrathecal pump catheter in which she developed non-retractable bilious vomiting following anesthesia, post-procedure. Patient's nausea has been well controlled with a combination of Erythromycin, Tigan, Ativan, and Compazine in the past.   - Continued Erythromycin 500 mg TID q8h nausea/vomiting  - Continued Tigan 200mg IV q8h PRN nausea/vomiting   - Continued Ativan 0.5mg IV q6h PRN breakthrough nausea/vomiting  - Per outpatient endocrinologist (Dr. Bucky Montoya) for [place]'s surgical management: Started Solucortef 50mg IV q8hr through 9PM, then double home dose (to 40mg AM and 10mg PM)    - Patient's nausea and vomiting has improved and she has been tolerating PO diet and oral medications     #[place]'s disease.   Pt has hx of David's disease, on home regimen of 20 mg AM and 5 mg PM  - follows with endocrinologist outpatient (Dr. Bucky Montoya)   - has a history of addisonian crisis post-procedures   - per Dr. Bucky Montoya recommendations: Continue Solucortef 50mg IV q8hr through 9PM first night, then double home dose (to 40mg AM and 10mg PM)   - Continued prednisone 4 mg daily.    #Diabetes.   Pt with hx of diabetes 2/2 chronic pancreatitis. HbA1c 6.3, last admission  Home PO meds: Invokana 300 mg daily, metformin 1000 mg BID when able to tolerate oral   Plan:  - Held home PO Invokana 300 mg daily, metformin 1000 mg BID while inpatient  - Continued insulin pump as her endocrinologist had instructed and programmed insulin pump to accommodate stress dose steroids (as per endocrinologist instructions)  - Glucose levels and diabetes well controlled     #Chronic abdominal pain.   Pt with chronic abdominal pain due to chronic pancreatitis and gastritis. Epigastric pain at baseline. Intrathecal pump was continued and trialed at [facility] 10/11 -- pt now S/P implant 10/18  Plan:  - Continued home gabapentin 300mg PO TID (as tolerated)  - Continued pantoprazole 40mg IV daily (therapeutic interchange for home nexium)  - Continued home celecoxib 100mg PO BID PRN mild pain  - Intra-thecal pump in place and working well for patient (dilaudid).    #GERD (gastroesophageal reflux disease).   Pt with hx of GERD   Plan:  - Continued Protonix 40 mg daily  - Continued Carafate 1g PO QID  - Famotidine 20mg PO BID     #Asthma.   Pt reports history of cough variant asthma, takes montelukast at home.  Plan:  - Continued home montelukast 10mg PO daily  - Loratadine 20 mg PO daily     #HLD (hyperlipidemia).   Home regimen: vascepa, lipitor 40mg, ezetimide 10mg   Plan:  - Continued home Lipitor 40mg.      Patient was discharged to: home     New medications: Tigan 300mg q8 prn for nausea for 5 days   Changes to old medications: N/A  Medications that were stopped: N/A     Items to follow up as outpatient:    Physical exam at the time of discharge:  GENERAL: NAD, lying in bed comfortably  HEENT:  Atraumatic, Normocephalic, EOMI, conjunctiva and sclera clear  NECK: Supple, No JVD  CHEST/LUNG: Clear to auscultation bilaterally; No rales, rhonchi, wheezing, or rubs.    HEART: Regular rate and rhythm; No murmurs, rubs, or gallops  ABDOMEN: Bowel sounds present; moderate tenderness to palpation; insulin implant in place on RUQ, glucose monitor in place in LUQ, Soft, epigastric tenderness to palpation, Nondistended. No hepatosplenomegaly; intrathecal pump site dressed, clean and dry at lower left back/flank region   EXTREMITIES:  2+ Peripheral Pulses, No clubbing, cyanosis, or edema  NERVOUS SYSTEM: Alert & Oriented X3

## 2021-10-19 NOTE — DISCHARGE NOTE PROVIDER - PROVIDER RX CONTACT NUMBER
Hyperglycemic.  400-500's.  Patient had scheduled Lantus 30U this morning and PRN Novolog 19U.  Last dose at noon.  Patient was hospitalized in December with Ketoacidosis.     [phone]

## 2021-10-19 NOTE — PROGRESS NOTE ADULT - PROBLEM SELECTOR PLAN 4
Pt with chronic abdominal pain due to chronic pancreatitis and gastritis. Epigastric pain at baseline. Intrathecal pump was continued and trialed at [facility] 10/11 -- pt now S/P implant 10/18  Plan:  - Cont home gabapentin 300mg PO TID (as tolerated)  - Cont pantoprazole 40mg IV daily (therapeutic interchange for home nexium)  - Cont home celecoxib 100mg PO BID PRN mild pain  - intra-thecal pump in place and working well for patient (dilaudid) Pt with chronic abdominal pain due to chronic pancreatitis and gastritis. Epigastric pain at baseline. Intrathecal pump was continued and trialed at [facility] 10/11 -- pt now S/P implant 10/18  Plan:  - Cont home gabapentin 300mg PO TID (as tolerated)  - Cont pantoprazole 40mg IV daily (therapeutic interchange for home nexium)  - Cont home celecoxib 100mg PO BID PRN mild pain  - Intra-thecal pump in place and working well for patient (dilaudid)

## 2021-10-19 NOTE — DISCHARGE NOTE PROVIDER - NSDCCPCAREPLAN_GEN_ALL_CORE_FT
PRINCIPAL DISCHARGE DIAGNOSIS  Diagnosis: Chronic nausea  Assessment and Plan of Treatment: You were admitted with progressively worsening nausea and vomiting following intrathecal pump placement likely in the setting of anesthesia exposure vs chronic conditions. You were recently admitted following an intrathecal pain pump catheter placement from 10/5-10/7 complicated by non-retractable N/V, treated with Erythromycin, compazine and ativan during admission. You were given Erythromycin 500 mg TID q8h,Tigan 200mg IV q8h PRN nausea/vomiting. Your nausea and vomiting has improved and you have been tolerating PO diet and oral medications. Please continue taking your medications that you have been prescribed and follow up with your outpatient PCP. If you notice new or worsening symptoms then please return to the hospital immediately.      SECONDARY DISCHARGE DIAGNOSES  Diagnosis: GERD (gastroesophageal reflux disease)  Assessment and Plan of Treatment: You have a h/o GERD and your current medications include Protonix 40 mg, Carafate 1g PO QID, Famotidine 20mg PO BID. You advise you to continue taking yur medications on time and follow up with your outpatient PCP for regular check ups. Following tips can help you further :Avoid lying down after meals.  Avoid eating late at night.  Elevate the head of your bed by 6 inches. You can do this by placing wooden blocks or bed risers under the head of your bed.  Avoid wearing tight-fitting clothes.  Avoid foods that might irritate your stomach, such as the following:  Alcohol  Fat  Chocolate  Caffeine  Spearmint or peppermint   If you notice new or worsening symptoms then please return to the hospital immediately.    Diagnosis: Asthma  Assessment and Plan of Treatment: You have a history of cough variant asthma anf take montelukast at home. Please continue taking home montelukast 10mg PO daily and  Loratadine 20 mg PO daily. Also, follow up with your outpatient PCP. If you notice new or worsening symptoms then please return to the hospital immediately.      Diagnosis: Diabetes  Assessment and Plan of Treatment: You have a hx of diabetes 2/2 chronic pancreatitis with a of HbA1c 6.3, last admission. Your home medications include Invokana 300 mg daily, metformin 1000 mg BID when able to tolerate oral. Please continue taking your home medications and Continue insulin pump as your endocrinologist had instructed and programmed insulin pump to accommodate stress dose steroids (as per endocrinologist instructions). Call 911 anytime you think you may need emergency care. For example, call if:  You passed out (lost consciousness), or you suddenly become very sleepy or confused. (You may have very low blood sugar.)  Call your doctor or nurse call line now or seek immediate medical care if:  Your blood sugar is 17.0 mmol/L or is higher than the level your doctor has set for you.  You have symptoms of low blood sugar, such as:  Sweating.  Feeling nervous, shaky, and weak.  Extreme hunger and slight nausea.  Dizziness and headache.  Blurred vision.  Confusion.    Diagnosis: [place]'s disease  Assessment and Plan of Treatment: You have a h/o of David's disease and are currently on home regimen of 20 mg AM and 5 mg PM. You have been following with endocrinologist outpatient (Dr. Bucky Montoya) and we advise you to continue following up with  for further management. Here are some tips that can help you manage Humboldt's disease:  Take your medicines exactly as prescribed. Call your doctor or nurse call line if you think you are having a problem with your medicine. You will have to take medicines for the rest of your life.  Wear medical alert jewellery. This lets others know that you have Humboldt's disease.  Weigh yourself regularly, especially if you haven't felt like eating or you've been vomiting. Weigh yourself at the same time each day and wear the same clothes each time you weigh yourself. Let your doctor know if you're losing weight or vomiting often.  Keep track of your blood pressure. Let your doctor know if it's high or too low. Also let your doctor know if you have swelling or you feel light-headed. You may need to adjust your dose of medicine.  Work with your doctor to create a plan for what to do when you're sick or when your body is under stress. You may need to change the amount of medicine you take during this time.  Don't reduce salt in your diet. You may need to add extra salt to your food during hot and humid weather or after exercise to replace salt lost through sweating.  When should you call for help?  Call 911 anytime you think you may need emergency care. For example, call if:  You have an adrenal crisis. Symptoms may include:  Severe vomiting and diarrhea.  Extreme weakness or feeling that you are going to pass out.  Sudden pain in the belly, lower back, and legs.  Strange behaviour, such as feeling confused or fearful.  Fainting or trouble staying awake.  A high fever.  A pale face and blue lips and earlobes.

## 2021-10-19 NOTE — PROGRESS NOTE ADULT - PROBLEM SELECTOR PLAN 2
Pt has hx of [place]'s disease, on home regimen of 20 mg AM and 5 mg PM  - follows with endocrinologist outpatient (Dr. Bucky Montoya)   - Continue solucortef 50mg IV q8hr for 24hrs (to 1:00 PM 10/19), then double home dose, 40mg AM and 10mg PM, then return to home dose 10/20, per outpatient endocrinologist for [place]'s surgical management  - Continue prednisone 4 mg daily Pt has hx of [place]'s disease, on home regimen of 20 mg AM and 5 mg PM  - follows with endocrinologist outpatient (Dr. Bucky Montoya)   - Continue Solucortef 50mg IV q8hr through [facility], then double home dose, 40mg AM and 10mg PM tomorrow, then return to home dose 10/21, per outpatient endocrinologist for [place]'s surgical management  - Continue prednisone 4 mg daily Pt has hx of [place]'s disease, on home regimen of 20 mg AM and 5 mg PM  - follows with endocrinologist outpatient (Dr. Bucky Montoya)   - has a history of addisonian crisis post-procedures   - per Dr. Bucky Montoya recommendations: Continue Solucortef 50mg IV q8hr through 9PM tonight, then double home dose (to 40mg AM and 10mg PM) tomorrow 10/20  - Continue prednisone 4 mg daily

## 2021-10-20 LAB
ALBUMIN SERPL ELPH-MCNC: 3.5 G/DL — SIGNIFICANT CHANGE UP (ref 3.3–5)
ALP SERPL-CCNC: 65 U/L — SIGNIFICANT CHANGE UP (ref 40–120)
ALT FLD-CCNC: 27 U/L — SIGNIFICANT CHANGE UP (ref 10–45)
ANION GAP SERPL CALC-SCNC: 12 MMOL/L — SIGNIFICANT CHANGE UP (ref 5–17)
AST SERPL-CCNC: 13 U/L — SIGNIFICANT CHANGE UP (ref 10–40)
BILIRUB SERPL-MCNC: 0.4 MG/DL — SIGNIFICANT CHANGE UP (ref 0.2–1.2)
BUN SERPL-MCNC: 19 MG/DL — SIGNIFICANT CHANGE UP (ref 7–23)
CALCIUM SERPL-MCNC: 9.4 MG/DL — SIGNIFICANT CHANGE UP (ref 8.4–10.5)
CHLORIDE SERPL-SCNC: 104 MMOL/L — SIGNIFICANT CHANGE UP (ref 96–108)
CO2 SERPL-SCNC: 25 MMOL/L — SIGNIFICANT CHANGE UP (ref 22–31)
CREAT SERPL-MCNC: 0.68 MG/DL — SIGNIFICANT CHANGE UP (ref 0.5–1.3)
GLUCOSE BLDC GLUCOMTR-MCNC: 103 MG/DL — HIGH (ref 70–99)
GLUCOSE BLDC GLUCOMTR-MCNC: 129 MG/DL — HIGH (ref 70–99)
GLUCOSE BLDC GLUCOMTR-MCNC: 141 MG/DL — HIGH (ref 70–99)
GLUCOSE SERPL-MCNC: 167 MG/DL — HIGH (ref 70–99)
HCT VFR BLD CALC: 41.1 % — SIGNIFICANT CHANGE UP (ref 34.5–45)
HGB BLD-MCNC: 12.9 G/DL — SIGNIFICANT CHANGE UP (ref 11.5–15.5)
INR BLD: 0.92 — SIGNIFICANT CHANGE UP (ref 0.88–1.16)
MAGNESIUM SERPL-MCNC: 2 MG/DL — SIGNIFICANT CHANGE UP (ref 1.6–2.6)
MCHC RBC-ENTMCNC: 29.7 PG — SIGNIFICANT CHANGE UP (ref 27–34)
MCHC RBC-ENTMCNC: 31.4 GM/DL — LOW (ref 32–36)
MCV RBC AUTO: 94.5 FL — SIGNIFICANT CHANGE UP (ref 80–100)
MELD SCORE WITH DIALYSIS: 20 POINTS — SIGNIFICANT CHANGE UP
MELD SCORE WITHOUT DIALYSIS: 6 POINTS — SIGNIFICANT CHANGE UP
NRBC # BLD: 0 /100 WBCS — SIGNIFICANT CHANGE UP (ref 0–0)
PHOSPHATE SERPL-MCNC: 4.3 MG/DL — SIGNIFICANT CHANGE UP (ref 2.5–4.5)
PLATELET # BLD AUTO: 381 K/UL — SIGNIFICANT CHANGE UP (ref 150–400)
POTASSIUM SERPL-MCNC: 4.3 MMOL/L — SIGNIFICANT CHANGE UP (ref 3.5–5.3)
POTASSIUM SERPL-SCNC: 4.3 MMOL/L — SIGNIFICANT CHANGE UP (ref 3.5–5.3)
PROT SERPL-MCNC: 6.3 G/DL — SIGNIFICANT CHANGE UP (ref 6–8.3)
PROTHROM AB SERPL-ACNC: 11.1 SEC — SIGNIFICANT CHANGE UP (ref 10.6–13.6)
RBC # BLD: 4.35 M/UL — SIGNIFICANT CHANGE UP (ref 3.8–5.2)
RBC # FLD: 14 % — SIGNIFICANT CHANGE UP (ref 10.3–14.5)
SODIUM SERPL-SCNC: 141 MMOL/L — SIGNIFICANT CHANGE UP (ref 135–145)
WBC # BLD: 10.75 K/UL — HIGH (ref 3.8–10.5)
WBC # FLD AUTO: 10.75 K/UL — HIGH (ref 3.8–10.5)

## 2021-10-20 PROCEDURE — 99233 SBSQ HOSP IP/OBS HIGH 50: CPT | Mod: GC

## 2021-10-20 RX ORDER — DIPHENHYDRAMINE HCL 50 MG
25 CAPSULE ORAL ONCE
Refills: 0 | Status: COMPLETED | OUTPATIENT
Start: 2021-10-20 | End: 2021-10-20

## 2021-10-20 RX ORDER — PROCHLORPERAZINE MALEATE 5 MG
5 TABLET ORAL ONCE
Refills: 0 | Status: COMPLETED | OUTPATIENT
Start: 2021-10-20 | End: 2021-10-20

## 2021-10-20 RX ADMIN — Medication 0.5 MILLIGRAM(S): at 19:18

## 2021-10-20 RX ADMIN — CELECOXIB 100 MILLIGRAM(S): 200 CAPSULE ORAL at 06:50

## 2021-10-20 RX ADMIN — ATORVASTATIN CALCIUM 40 MILLIGRAM(S): 80 TABLET, FILM COATED ORAL at 22:43

## 2021-10-20 RX ADMIN — Medication 200 MILLIGRAM(S): at 18:08

## 2021-10-20 RX ADMIN — FAMOTIDINE 20 MILLIGRAM(S): 10 INJECTION INTRAVENOUS at 18:09

## 2021-10-20 RX ADMIN — Medication 0.5 MILLIGRAM(S): at 10:46

## 2021-10-20 RX ADMIN — CELECOXIB 100 MILLIGRAM(S): 200 CAPSULE ORAL at 19:12

## 2021-10-20 RX ADMIN — GABAPENTIN 300 MILLIGRAM(S): 400 CAPSULE ORAL at 13:56

## 2021-10-20 RX ADMIN — Medication 10 MILLIGRAM(S): at 18:11

## 2021-10-20 RX ADMIN — Medication 4 MILLIGRAM(S): at 06:49

## 2021-10-20 RX ADMIN — Medication 25 MILLIGRAM(S): at 22:42

## 2021-10-20 RX ADMIN — FAMOTIDINE 20 MILLIGRAM(S): 10 INJECTION INTRAVENOUS at 06:48

## 2021-10-20 RX ADMIN — Medication 500 MILLIGRAM(S): at 23:50

## 2021-10-20 RX ADMIN — CELECOXIB 100 MILLIGRAM(S): 200 CAPSULE ORAL at 18:09

## 2021-10-20 RX ADMIN — Medication 500 MILLIGRAM(S): at 06:50

## 2021-10-20 RX ADMIN — Medication 500 MILLIGRAM(S): at 18:10

## 2021-10-20 RX ADMIN — Medication 40 MILLIGRAM(S): at 06:48

## 2021-10-20 RX ADMIN — PANTOPRAZOLE SODIUM 40 MILLIGRAM(S): 20 TABLET, DELAYED RELEASE ORAL at 11:28

## 2021-10-20 RX ADMIN — GABAPENTIN 300 MILLIGRAM(S): 400 CAPSULE ORAL at 06:47

## 2021-10-20 RX ADMIN — Medication 200 MILLIGRAM(S): at 09:30

## 2021-10-20 RX ADMIN — LORATADINE 20 MILLIGRAM(S): 10 TABLET ORAL at 18:10

## 2021-10-20 RX ADMIN — Medication 5 MILLIGRAM(S): at 01:35

## 2021-10-20 RX ADMIN — Medication 500 MILLIGRAM(S): at 11:29

## 2021-10-20 RX ADMIN — CELECOXIB 100 MILLIGRAM(S): 200 CAPSULE ORAL at 07:25

## 2021-10-20 RX ADMIN — Medication 25 MILLIGRAM(S): at 01:35

## 2021-10-20 RX ADMIN — GABAPENTIN 300 MILLIGRAM(S): 400 CAPSULE ORAL at 22:43

## 2021-10-20 RX ADMIN — MONTELUKAST 10 MILLIGRAM(S): 4 TABLET, CHEWABLE ORAL at 11:28

## 2021-10-20 NOTE — PROGRESS NOTE ADULT - PROBLEM SELECTOR PLAN 3
Pt with hx of diabetes 2/2 chronic pancreatitis. HbA1c 6.3, last admission  Home PO meds: Invokana 300 mg daily, metformin 1000 mg BID when able to tolerate oral   Plan:  - Hold home PO Invokana 300 mg daily, metformin 1000 mg BID while inpatient  - Continue use of insulin pump as her endocrinologist had instructed and programmed insulin pump to accommodate stress dose steroids (as per endocrinologist instructions) Pt with hx of diabetes 2/2 chronic pancreatitis. HbA1c 6.3, last admission  Home PO meds: Invokana 300 mg daily, metformin 1000 mg BID when able to tolerate oral   Plan:  - Hold home PO Invokana 300 mg daily, metformin 1000 mg BID while inpatient  - Continue use of insulin pump as her endocrinologist had instructed and programmed insulin pump to accommodate stress dose steroids (as per endocrinologist instructions)  - glucose well controlled

## 2021-10-20 NOTE — PROGRESS NOTE ADULT - SUBJECTIVE AND OBJECTIVE BOX
OVERNIGHT EVENTS:    SUBJECTIVE / INTERVAL HPI: Patient seen and examined at bedside.     VITAL SIGNS:  Vital Signs Last 24 Hrs  T(C): 36.5 (20 Oct 2021 05:15), Max: 37.1 (19 Oct 2021 20:54)  T(F): 97.7 (20 Oct 2021 05:15), Max: 98.7 (19 Oct 2021 20:54)  HR: 95 (20 Oct 2021 05:15) (68 - 95)  BP: 124/73 (20 Oct 2021 05:15) (108/74 - 124/73)  BP(mean): 20 (20 Oct 2021 05:15) (20 - 20)  RR: 20 (20 Oct 2021 05:15) (18 - 20)  SpO2: 97% (20 Oct 2021 05:15) (96% - 97%)    PHYSICAL EXAM:    General: WDWN  HEENT: NC/AT; PERRL, anicteric sclera; MMM  Neck: supple  Cardiovascular: +S1/S2, RRR  Respiratory: CTA B/L; no W/R/R  Gastrointestinal: soft, NT/ND; +BSx4  Extremities: WWP; no edema, clubbing or cyanosis  Vascular: 2+ radial, DP/PT pulses B/L  Neurological: AAOx3; no focal deficits    MEDICATIONS:  MEDICATIONS  (STANDING):  atorvastatin 40 milliGRAM(s) Oral at bedtime  celecoxib 100 milliGRAM(s) Oral every 12 hours  dextrose 40% Gel 15 Gram(s) Oral once  dextrose 5%. 1000 milliLiter(s) (50 mL/Hr) IV Continuous <Continuous>  dextrose 5%. 1000 milliLiter(s) (100 mL/Hr) IV Continuous <Continuous>  dextrose 50% Injectable 25 Gram(s) IV Push once  dextrose 50% Injectable 12.5 Gram(s) IV Push once  dextrose 50% Injectable 25 Gram(s) IV Push once  erythromycin     enteric coated 500 milliGRAM(s) Oral every 6 hours  famotidine    Tablet 20 milliGRAM(s) Oral two times a day  gabapentin 300 milliGRAM(s) Oral every 8 hours  glucagon  Injectable 1 milliGRAM(s) IntraMuscular once  hydrocortisone 10 milliGRAM(s) Oral once  influenza   Vaccine 0.5 milliLiter(s) IntraMuscular once  insulin lispro (ADMELOG) corrective regimen sliding scale   SubCutaneous three times a day before meals  loratadine 20 milliGRAM(s) Oral every 24 hours  montelukast 10 milliGRAM(s) Oral daily  pantoprazole  Injectable 40 milliGRAM(s) IV Push daily  predniSONE   Tablet 4 milliGRAM(s) Oral daily  sodium chloride 0.9%. 1000 milliLiter(s) (100 mL/Hr) IV Continuous <Continuous>  sucralfate 1 Gram(s) Oral every 6 hours    MEDICATIONS  (PRN):  LORazepam   Injectable 0.5 milliGRAM(s) IV Push every 8 hours PRN Nausea and/or Vomiting  trimethobenzamide Injectable 200 milliGRAM(s) IntraMuscular every 8 hours PRN Nausea and/or Vomiting      ALLERGIES:  Allergies    amoxicillin (Unknown)  penicillin (Unknown)  Reglan (Unknown)  Topamax (Unknown)    Intolerances    pt intolerant to ETOH due to history of pancreatitis (Unknown)  Tylenol (Unknown)      LABS:                        13.5   13.61 )-----------( 398      ( 19 Oct 2021 07:11 )             42.2     10-19    140  |  104  |  16  ----------------------------<  145<H>  4.2   |  26  |  0.70    Ca    9.3      19 Oct 2021 07:11  Phos  4.2     10-19  Mg     2.0     10-19    TPro  6.7  /  Alb  4.0  /  TBili  0.2  /  DBili  x   /  AST  18  /  ALT  37  /  AlkPhos  71  10-18        CAPILLARY BLOOD GLUCOSE      POCT Blood Glucose.: 137 mg/dL (19 Oct 2021 22:28)      RADIOLOGY & ADDITIONAL TESTS: Reviewed. SUBJECTIVE / INTERVAL HPI: Patient seen and examined at bedside. She states that she experienced worsening nausea yesterday and was not able to tolerate a PO diet; nausea improved with the addition of Ativan, Tigan, and Compazine to anti-nausea regimen. She feels better now and has been able to keep down her oral medications.     VITAL SIGNS:  Vital Signs Last 24 Hrs  T(C): 36.5 (20 Oct 2021 05:15), Max: 37.1 (19 Oct 2021 20:54)  T(F): 97.7 (20 Oct 2021 05:15), Max: 98.7 (19 Oct 2021 20:54)  HR: 95 (20 Oct 2021 05:15) (68 - 95)  BP: 124/73 (20 Oct 2021 05:15) (108/74 - 124/73)  BP(mean): 20 (20 Oct 2021 05:15) (20 - 20)  RR: 20 (20 Oct 2021 05:15) (18 - 20)  SpO2: 97% (20 Oct 2021 05:15) (96% - 97%)    PHYSICAL EXAM:    GENERAL: NAD, lying in bed comfortably  HEENT:  Atraumatic, Normocephalic, EOMI, conjunctiva and sclera clear  NECK: Supple, No JVD  CHEST/LUNG: Clear to auscultation bilaterally; No rales, rhonchi, wheezing, or rubs.    HEART: Regular rate and rhythm; No murmurs, rubs, or gallops  ABDOMEN: Bowel sounds present; moderate tenderness to palpation; insulin implant in place on RUQ, glucose monitor in place in LUQ, Soft, epigastric tenderness to palpation, Nondistended. No hepatosplenomegaly; intrathecal pump site dressed, clean and dry at lower left back/flank region   EXTREMITIES:  2+ Peripheral Pulses, No clubbing, cyanosis, or edema  NERVOUS SYSTEM: Alert & Oriented X3    MEDICATIONS:  MEDICATIONS  (STANDING):  atorvastatin 40 milliGRAM(s) Oral at bedtime  celecoxib 100 milliGRAM(s) Oral every 12 hours  dextrose 40% Gel 15 Gram(s) Oral once  dextrose 5%. 1000 milliLiter(s) (50 mL/Hr) IV Continuous <Continuous>  dextrose 5%. 1000 milliLiter(s) (100 mL/Hr) IV Continuous <Continuous>  dextrose 50% Injectable 25 Gram(s) IV Push once  dextrose 50% Injectable 12.5 Gram(s) IV Push once  dextrose 50% Injectable 25 Gram(s) IV Push once  erythromycin     enteric coated 500 milliGRAM(s) Oral every 6 hours  famotidine    Tablet 20 milliGRAM(s) Oral two times a day  gabapentin 300 milliGRAM(s) Oral every 8 hours  glucagon  Injectable 1 milliGRAM(s) IntraMuscular once  hydrocortisone 10 milliGRAM(s) Oral once  influenza   Vaccine 0.5 milliLiter(s) IntraMuscular once  insulin lispro (ADMELOG) corrective regimen sliding scale   SubCutaneous three times a day before meals  loratadine 20 milliGRAM(s) Oral every 24 hours  montelukast 10 milliGRAM(s) Oral daily  pantoprazole  Injectable 40 milliGRAM(s) IV Push daily  predniSONE   Tablet 4 milliGRAM(s) Oral daily  sodium chloride 0.9%. 1000 milliLiter(s) (100 mL/Hr) IV Continuous <Continuous>  sucralfate 1 Gram(s) Oral every 6 hours    MEDICATIONS  (PRN):  LORazepam   Injectable 0.5 milliGRAM(s) IV Push every 8 hours PRN Nausea and/or Vomiting  trimethobenzamide Injectable 200 milliGRAM(s) IntraMuscular every 8 hours PRN Nausea and/or Vomiting      ALLERGIES:  Allergies    amoxicillin (Unknown)  penicillin (Unknown)  Reglan (Unknown)  Topamax (Unknown)    Intolerances    pt intolerant to ETOH due to history of pancreatitis (Unknown)  Tylenol (Unknown)      LABS:                        13.5   13.61 )-----------( 398      ( 19 Oct 2021 07:11 )             42.2     10-19    140  |  104  |  16  ----------------------------<  145<H>  4.2   |  26  |  0.70    Ca    9.3      19 Oct 2021 07:11  Phos  4.2     10-19  Mg     2.0     10-19    TPro  6.7  /  Alb  4.0  /  TBili  0.2  /  DBili  x   /  AST  18  /  ALT  37  /  AlkPhos  71  10-18        CAPILLARY BLOOD GLUCOSE      POCT Blood Glucose.: 137 mg/dL (19 Oct 2021 22:28)      RADIOLOGY & ADDITIONAL TESTS: Reviewed.

## 2021-10-20 NOTE — PROGRESS NOTE ADULT - PROBLEM SELECTOR PLAN 1
Pt was recently admitted on 10/5-10/7 following intrathecal pump catheter in which she developed non-retractable bilious vomiting following anesthesia, post-procedure. Patient's nausea has been well controlled with a combination of Erythromycin, Tigan, Ativan, and Compazine in the past.   Plan (in coordination with outpatient endocrinologist recommendations)   - Continue Erythromycin 500 mg q8h   - Ativan 0.5 mg IV push q8h PRN  - Tigan 200 mg IM q8h PRN   - Solucortef - double oral home dose (to 40mg AM and 10mg PM), per outpatient endocrinologist for [place]'s surgical management  - Thus far, patient's nausea and vomiting has improved and she has been tolerating PO diet   - Assess if patient is able to tolerate liquid diet to regular solid diet; if able, patient can be discharged Pt was recently admitted on 10/5-10/7 following intrathecal pump catheter in which she developed non-retractable bilious vomiting following anesthesia, post-procedure. Patient's nausea has been well controlled with a combination of Erythromycin, Tigan, Ativan, and Compazine in the past.   Plan (in coordination with outpatient endocrinologist recommendations)   - Continue Erythromycin 500 mg q8h   - Ativan 0.5 mg IV push q8h PRN  - Tigan 200 mg IM q8h PRN   - Solucortef - double oral home dose (to 40mg AM and 10mg PM), per outpatient endocrinologist for [place]'s surgical management  - Thus far, patient's nausea and vomiting has improved on current medication regimen   - Patient has been able to keep down oral medications and tolerating PO diet

## 2021-10-20 NOTE — PROGRESS NOTE ADULT - PROBLEM SELECTOR PLAN 2
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 Bucky Montoya)   - has a history of addisonian crisis post-procedures   - per Dr. Bucky Montoya recommendations: double oral home dose (to 40mg AM and 10mg PM)   - Continue prednisone 4 mg daily  - monitor ability to keep down oral medications vs. 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 Bucky Montoya)   - has a history of addisonian crisis post-procedures   - per Dr. 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 Bucky Montoya)   - has a history of addisonian crisis post-procedures   - per Dr. Bucky Montoya recommendations: double oral home dose (to 40mg AM and 10mg PM)   - Continue prednisone 4 mg daily  - Endocrinology consulted for stress dose steroid recommendations

## 2021-10-20 NOTE — PROGRESS NOTE ADULT - PROBLEM SELECTOR PLAN 5
Pt with hx of GERD   Plan:  - Cont Protonix 40 mg IV daily  - Cont Carafate 1g PO QID  - Cont famotidine 20 mg PO BID

## 2021-10-20 NOTE — PROGRESS NOTE ADULT - ATTENDING COMMENTS
Pt seen and examined by me at bedside this AM. Agree with above with additions,   tolerated breakfast, wishes to have regular diet for lunch.   to be discharged today home after tolerating lunch.
Pt seen and examined by me this AM with housestaff. Agree with treatment plan as above.   improved n/v, will continue to monitor till tomorrow.

## 2021-10-20 NOTE — PROGRESS NOTE ADULT - PROBLEM SELECTOR PLAN 6
Pt reports history of cough variant asthma, takes montelukast at home.  Plan:  - Cont home montelukast 10mg PO daily.
Pt reports history of cough variant asthma, takes montelukast at home.  Plan:  - Cont home montelukast 10mg PO daily  - Loratadine 20mg PO daily

## 2021-10-20 NOTE — PROGRESS NOTE ADULT - ASSESSMENT
Ms. Garcia is a 52yo F with a PMH of David's disease, DM, bile reflux with chronic nausea, severe acute pancreatitis (2005), pancreatic cysts, gastroparesis, and chronic pain, presenting with progressively worsening nausea and vomiting following intrathecal pump placement, in the setting of anesthesia exposure vs. chronic conditions.   
Ms. Garcia is a 52yo F with a PMH of David's disease, DM, bile reflux with chronic nausea, severe acute pancreatitis (2005), pancreatic cysts, gastroparesis, and chronic pain, presenting with progressively worsening nausea and vomiting following intrathecal pump placement, in the setting of anesthesia exposure vs. chronic conditions.

## 2021-10-20 NOTE — PROGRESS NOTE ADULT - PROBLEM SELECTOR PLAN 8
F: 100cc/hr normal saline overnight  E: Replete K<4, Mg<2  N: Consistent carbohydrate  D: clears    Code: Full  Dispo: [facility]
F: 100cc/hr normal saline overnight  E: Replete K<4, Mg<2  N: Consistent carbohydrate  D: regular diet     Code: Full  Dispo: [facility]

## 2021-10-20 NOTE — PROGRESS NOTE ADULT - PROBLEM SELECTOR PLAN 4
Pt with chronic abdominal pain due to chronic pancreatitis and gastritis. Epigastric pain at baseline. Intrathecal pump was continued and trialed at [facility] 10/11 -- pt now S/P implant 10/18  Plan:  - Cont home gabapentin 300mg PO TID    - Cont home celecoxib 100mg PO BID   - Intra-thecal pump in place and working well for patient (dilaudid)

## 2021-10-21 ENCOUNTER — TRANSCRIPTION ENCOUNTER (OUTPATIENT)
Age: 51
End: 2021-10-21

## 2021-10-21 VITALS
TEMPERATURE: 98 F | OXYGEN SATURATION: 96 % | HEART RATE: 99 BPM | SYSTOLIC BLOOD PRESSURE: 131 MMHG | DIASTOLIC BLOOD PRESSURE: 83 MMHG | RESPIRATION RATE: 18 BRPM

## 2021-10-21 LAB
ANION GAP SERPL CALC-SCNC: 12 MMOL/L — SIGNIFICANT CHANGE UP (ref 5–17)
BUN SERPL-MCNC: 22 MG/DL — SIGNIFICANT CHANGE UP (ref 7–23)
CALCIUM SERPL-MCNC: 9 MG/DL — SIGNIFICANT CHANGE UP (ref 8.4–10.5)
CHLORIDE SERPL-SCNC: 103 MMOL/L — SIGNIFICANT CHANGE UP (ref 96–108)
CO2 SERPL-SCNC: 23 MMOL/L — SIGNIFICANT CHANGE UP (ref 22–31)
CREAT SERPL-MCNC: 0.79 MG/DL — SIGNIFICANT CHANGE UP (ref 0.5–1.3)
GLUCOSE BLDC GLUCOMTR-MCNC: 206 MG/DL — HIGH (ref 70–99)
GLUCOSE SERPL-MCNC: 170 MG/DL — HIGH (ref 70–99)
HCT VFR BLD CALC: 41.1 % — SIGNIFICANT CHANGE UP (ref 34.5–45)
HGB BLD-MCNC: 13.1 G/DL — SIGNIFICANT CHANGE UP (ref 11.5–15.5)
MAGNESIUM SERPL-MCNC: 2 MG/DL — SIGNIFICANT CHANGE UP (ref 1.6–2.6)
MCHC RBC-ENTMCNC: 30.5 PG — SIGNIFICANT CHANGE UP (ref 27–34)
MCHC RBC-ENTMCNC: 31.9 GM/DL — LOW (ref 32–36)
MCV RBC AUTO: 95.8 FL — SIGNIFICANT CHANGE UP (ref 80–100)
NRBC # BLD: 0 /100 WBCS — SIGNIFICANT CHANGE UP (ref 0–0)
PHOSPHATE SERPL-MCNC: 4.6 MG/DL — HIGH (ref 2.5–4.5)
PLATELET # BLD AUTO: 365 K/UL — SIGNIFICANT CHANGE UP (ref 150–400)
POTASSIUM SERPL-MCNC: SIGNIFICANT CHANGE UP MMOL/L (ref 3.5–5.3)
POTASSIUM SERPL-SCNC: SIGNIFICANT CHANGE UP MMOL/L (ref 3.5–5.3)
RBC # BLD: 4.29 M/UL — SIGNIFICANT CHANGE UP (ref 3.8–5.2)
RBC # FLD: 14.2 % — SIGNIFICANT CHANGE UP (ref 10.3–14.5)
SODIUM SERPL-SCNC: 138 MMOL/L — SIGNIFICANT CHANGE UP (ref 135–145)
WBC # BLD: 10.76 K/UL — HIGH (ref 3.8–10.5)
WBC # FLD AUTO: 10.76 K/UL — HIGH (ref 3.8–10.5)

## 2021-10-21 PROCEDURE — 99239 HOSP IP/OBS DSCHRG MGMT >30: CPT

## 2021-10-21 RX ORDER — HYDROCORTISONE 20 MG
20 TABLET ORAL ONCE
Refills: 0 | Status: COMPLETED | OUTPATIENT
Start: 2021-10-21 | End: 2021-10-21

## 2021-10-21 RX ORDER — INFLUENZA VIRUS VACCINE 15; 15; 15; 15 UG/.5ML; UG/.5ML; UG/.5ML; UG/.5ML
0.5 SUSPENSION INTRAMUSCULAR ONCE
Refills: 0 | Status: DISCONTINUED | OUTPATIENT
Start: 2021-10-21 | End: 2021-10-21

## 2021-10-21 RX ORDER — LORATADINE 10 MG/1
10 TABLET ORAL ONCE
Refills: 0 | Status: COMPLETED | OUTPATIENT
Start: 2021-10-21 | End: 2021-10-21

## 2021-10-21 RX ORDER — LORATADINE 10 MG/1
2 TABLET ORAL
Qty: 0 | Refills: 0 | DISCHARGE
Start: 2021-10-21

## 2021-10-21 RX ADMIN — Medication 200 MILLIGRAM(S): at 02:19

## 2021-10-21 RX ADMIN — GABAPENTIN 300 MILLIGRAM(S): 400 CAPSULE ORAL at 06:17

## 2021-10-21 RX ADMIN — Medication 0.5 MILLIGRAM(S): at 04:05

## 2021-10-21 RX ADMIN — LORATADINE 10 MILLIGRAM(S): 10 TABLET ORAL at 00:55

## 2021-10-21 RX ADMIN — Medication 20 MILLIGRAM(S): at 08:59

## 2021-10-21 RX ADMIN — Medication 4 MILLIGRAM(S): at 06:16

## 2021-10-21 RX ADMIN — FAMOTIDINE 20 MILLIGRAM(S): 10 INJECTION INTRAVENOUS at 06:16

## 2021-10-21 RX ADMIN — CELECOXIB 100 MILLIGRAM(S): 200 CAPSULE ORAL at 06:17

## 2021-10-21 RX ADMIN — Medication 200 MILLIGRAM(S): at 10:30

## 2021-10-21 RX ADMIN — Medication 500 MILLIGRAM(S): at 06:16

## 2021-10-21 RX ADMIN — INFLUENZA VIRUS VACCINE 0.5 MILLILITER(S): 15; 15; 15; 15 SUSPENSION INTRAMUSCULAR at 10:08

## 2021-10-21 RX ADMIN — CELECOXIB 100 MILLIGRAM(S): 200 CAPSULE ORAL at 07:04

## 2021-10-21 NOTE — DISCHARGE NOTE NURSING/CASE MANAGEMENT/SOCIAL WORK - NSDCVIVACCINE_GEN_ALL_CORE_FT
influenza, injectable, quadrivalent, preservative free; 21-Oct-2021 10:08; Dameon Carver (RN); [employer]; [id number] (Exp. Date: 30-Jun-2022); IntraMuscular; Deltoid Left.; 0.5 milliLiter(s); VIS (VIS Published: 06-Aug-2021, VIS Presented: 21-Oct-2021);

## 2021-10-21 NOTE — DISCHARGE NOTE NURSING/CASE MANAGEMENT/SOCIAL WORK - PATIENT PORTAL LINK FT
You can access the FollowMyHealth Patient Portal offered by [facility] by registering at the following website: [email or web address]. By joining [facility]’s FollowMyHealth portal, you will also be able to view your health information using other applications (apps) compatible with our system.

## 2021-10-26 PROCEDURE — 82962 GLUCOSE BLOOD TEST: CPT

## 2021-10-26 PROCEDURE — 85025 COMPLETE CBC W/AUTO DIFF WBC: CPT

## 2021-10-26 PROCEDURE — 96374 THER/PROPH/DIAG INJ IV PUSH: CPT

## 2021-10-26 PROCEDURE — 36415 COLL VENOUS BLD VENIPUNCTURE: CPT

## 2021-10-26 PROCEDURE — 87086 URINE CULTURE/COLONY COUNT: CPT

## 2021-10-26 PROCEDURE — 87635 SARS-COV-2 COVID-19 AMP PRB: CPT

## 2021-10-26 PROCEDURE — 84100 ASSAY OF PHOSPHORUS: CPT

## 2021-10-26 PROCEDURE — 83036 HEMOGLOBIN GLYCOSYLATED A1C: CPT

## 2021-10-26 PROCEDURE — 99285 EMERGENCY DEPT VISIT HI MDM: CPT

## 2021-10-26 PROCEDURE — 96375 TX/PRO/DX INJ NEW DRUG ADDON: CPT

## 2021-10-26 PROCEDURE — 74177 CT ABD & PELVIS W/CONTRAST: CPT

## 2021-10-26 PROCEDURE — 83735 ASSAY OF MAGNESIUM: CPT

## 2021-10-26 PROCEDURE — 85027 COMPLETE CBC AUTOMATED: CPT

## 2021-10-26 PROCEDURE — 80061 LIPID PANEL: CPT

## 2021-10-26 PROCEDURE — 86769 SARS-COV-2 COVID-19 ANTIBODY: CPT

## 2021-10-26 PROCEDURE — 93005 ELECTROCARDIOGRAM TRACING: CPT

## 2021-10-26 PROCEDURE — 70450 CT HEAD/BRAIN W/O DYE: CPT

## 2021-10-26 PROCEDURE — 83690 ASSAY OF LIPASE: CPT

## 2021-10-26 PROCEDURE — 80048 BASIC METABOLIC PNL TOTAL CA: CPT

## 2021-10-26 PROCEDURE — 74019 RADEX ABDOMEN 2 VIEWS: CPT

## 2021-10-26 PROCEDURE — 96376 TX/PRO/DX INJ SAME DRUG ADON: CPT

## 2021-10-26 PROCEDURE — 80053 COMPREHEN METABOLIC PANEL: CPT

## 2021-10-26 PROCEDURE — 76000 FLUOROSCOPY <1 HR PHYS/QHP: CPT

## 2021-12-22 PROCEDURE — 84100 ASSAY OF PHOSPHORUS: CPT

## 2021-12-22 PROCEDURE — 86769 SARS-COV-2 COVID-19 ANTIBODY: CPT

## 2021-12-22 PROCEDURE — 83735 ASSAY OF MAGNESIUM: CPT

## 2021-12-22 PROCEDURE — 82962 GLUCOSE BLOOD TEST: CPT

## 2021-12-22 PROCEDURE — 36415 COLL VENOUS BLD VENIPUNCTURE: CPT

## 2021-12-22 PROCEDURE — 76000 FLUOROSCOPY <1 HR PHYS/QHP: CPT

## 2021-12-22 PROCEDURE — 80048 BASIC METABOLIC PNL TOTAL CA: CPT

## 2021-12-22 PROCEDURE — C1889: CPT

## 2021-12-22 PROCEDURE — 85025 COMPLETE CBC W/AUTO DIFF WBC: CPT

## 2021-12-22 PROCEDURE — C1755: CPT

## 2021-12-22 PROCEDURE — 80053 COMPREHEN METABOLIC PANEL: CPT

## 2021-12-22 PROCEDURE — 85027 COMPLETE CBC AUTOMATED: CPT

## 2021-12-22 PROCEDURE — C1772: CPT

## 2021-12-22 PROCEDURE — 90686 IIV4 VACC NO PRSV 0.5 ML IM: CPT

## 2022-10-18 PROBLEM — K76.0 FATTY (CHANGE OF) LIVER, NOT ELSEWHERE CLASSIFIED: Chronic | Status: ACTIVE | Noted: 2021-10-15

## 2022-10-18 PROBLEM — Z87.39 PERSONAL HISTORY OF OTHER DISEASES OF THE MUSCULOSKELETAL SYSTEM AND CONNECTIVE TISSUE: Chronic | Status: ACTIVE | Noted: 2021-10-15

## 2022-10-18 PROBLEM — Z87.19 PERSONAL HISTORY OF OTHER DISEASES OF THE DIGESTIVE SYSTEM: Chronic | Status: ACTIVE | Noted: 2021-10-15

## 2022-10-18 PROBLEM — I10 ESSENTIAL (PRIMARY) HYPERTENSION: Chronic | Status: ACTIVE | Noted: 2021-10-18

## 2022-10-18 PROBLEM — K31.84 GASTROPARESIS: Chronic | Status: ACTIVE | Noted: 2021-10-15

## 2022-10-24 NOTE — ED ADULT NURSE NOTE - ALCOHOL PRE SCREEN (AUDIT - C)
Daily Note     Today's date: 10/24/2022  Patient name: Jose G Bean  : 1972  MRN: [id number]  Referring provider: Danielle Jama DO  Dx:   Encounter Diagnosis     ICD-10-CM    1  Symptoms of cerebrovascular accident (CVA)  R09 89    2  Cerebrovascular accident (CVA), unspecified mechanism ([address] )  I63 9                   Subjective: Seems to be getting a little bit better  Still waiting to see vascular to schedule procedure for stents  Notes he has a procedure done today was given a xannax and still feels a little off  Feelings of dizziness or disorienting once in a while  Objective: See treatment diary below       Assessment: held turning on treadmill today due to patient request after use of xannax  Tolerated treatment well  Minimally challenged with higher level balance and dual tasks today  Slower to progress throught balance challengs with cog task, but able to complete  Plan: Progress note during next visit  Progress treatment as tolerated         Short Term Goal Expiration Date:(10/28/22)  Long Term Goal Expiration Date: (22)   POC Expiration Date: (22)     Precautions: Significant BL carotid occlusion, HTN       Manuals 10/24  10/10 10/12 10/17                                   Neuro Re-Ed         Pt Education        Amb w/HT/HN Hallway   No solo  Fwd 100ft ea  back 100ft ea 2ea            Amb w/Turns        Foam Beams Solo   Sidestepping  W/HT and HN  2 laps each     Stepping over high hurdles  Sideways  Forward with cog tasks    forward walking with cones taps   w/cog tasks   Solo step:    HT/hn - 3 laps ea     perturbations with MTB: 3 laps ea    Tandem with pull mtb - 2 laps - 117 bpm    Side stepping pull mT - 2 labs ea - 118 bpm    wtd ball toss - rb - 3 laps side to side - 115bpm SOLO    Foam beams  Side stepping w/HT/HN  Tandem w/HT/HN  X 3 laps ea  HR 99    No Foam  Walking w/ MTB pull carrying 15# bolster 1/4 length x 3 laps    Side stepping MTB pull carrying 15# bolster 1/4 length x 3laps      Red Mat w/wts underneath  Fwd w/tennis ball toss Hand to Hand x 4 laps    Red Mat w/wts underneath  Sidestepping w/ball toss  HR 99       Solo     foam beams  Side stepping w/ HT/HN x4 laps ea    Tandem with HT/HN x4 laps ea     Treadmill walking solo    B/L UE   2 0mph  @ 3 min  RPE 3/10    2  5mph  3 0% incline   @5 min   Hr: 112  BPM  o2 92%    @ 7 min   RPE 4-5/10    @10 min    BPM    12 min RPE 6/10        7 min -  - 10% - 2 1 mph forward    7 min direction changes fwd/bkwd turns with obstacles random - 10% grade 1 2 mph     throughout  TM unavailable Solo   1 8 mph x5 min  1 8 mph 8% incline x5 min    /90 mmHg post     Tandem walking Fwd 30ft  back 30ft    W/HT fwd  30ft  X 2    W/hn fwd  30ft x 2       Agility  Solo   two foot jump [id number]     M/L  10x      BPM  o2 98%        Ther Ex                                                                        Ther Activity        Climb ladder     Solo with CGA  Up/down small A-frame, reaching overhead at top    No weight x3    5# bolster x3    10# bolster x3           Gait Training    SOLO   Fwd/bwd walking w/change of direction                      Modalities Statement Selected

## 2022-11-11 RX ORDER — CHOLESTYRAMINE 4 G/9G
0 POWDER, FOR SUSPENSION ORAL
Qty: 0 | Refills: 0 | DISCHARGE

## 2022-11-11 RX ORDER — SUCRALFATE 1 G
1 TABLET ORAL
Qty: 0 | Refills: 0 | DISCHARGE

## 2022-11-11 RX ORDER — ESOMEPRAZOLE MAGNESIUM 40 MG/1
1 CAPSULE, DELAYED RELEASE ORAL
Qty: 0 | Refills: 0 | DISCHARGE

## 2022-11-11 RX ORDER — SEMAGLUTIDE 0.68 MG/ML
0 INJECTION, SOLUTION SUBCUTANEOUS
Qty: 0 | Refills: 0 | DISCHARGE

## 2022-11-11 RX ORDER — FAMOTIDINE 10 MG/ML
1 INJECTION INTRAVENOUS
Qty: 0 | Refills: 0 | DISCHARGE

## 2022-11-11 RX ORDER — HYDROMORPHONE HYDROCHLORIDE 2 MG/ML
1 INJECTION INTRAMUSCULAR; INTRAVENOUS; SUBCUTANEOUS
Qty: 0 | Refills: 0 | DISCHARGE

## 2022-11-11 RX ORDER — INFLUENZA VIRUS VACCINE 15; 15; 15; 15 UG/.5ML; UG/.5ML; UG/.5ML; UG/.5ML
0.5 SUSPENSION INTRAMUSCULAR ONCE
Refills: 0 | Status: DISCONTINUED | OUTPATIENT
Start: 2022-11-14 | End: 2022-11-16

## 2022-11-11 RX ORDER — MONTELUKAST 4 MG/1
1 TABLET, CHEWABLE ORAL
Qty: 0 | Refills: 0 | DISCHARGE

## 2022-11-11 NOTE — PATIENT PROFILE ADULT - FALL HARM RISK - UNIVERSAL INTERVENTIONS
Bed in lowest position, wheels locked, appropriate side rails in place/Call bell, personal items and telephone in reach/Instruct patient to call for assistance before getting out of bed or chair/Non-slip footwear when patient is out of bed/[place] to call system/Physically safe environment - no spills, clutter or unnecessary equipment/Purposeful Proactive Rounding/Room/bathroom lighting operational, light cord in reach

## 2022-11-13 ENCOUNTER — TRANSCRIPTION ENCOUNTER (OUTPATIENT)
Age: 52
End: 2022-11-13

## 2022-11-14 ENCOUNTER — INPATIENT (INPATIENT)
Facility: HOSPITAL | Age: 52
LOS: 1 days | Discharge: HOME | DRG: 41 | End: 2022-11-16
Attending: STUDENT IN AN ORGANIZED HEALTH CARE EDUCATION/TRAINING PROGRAM | Admitting: GENERAL ACUTE CARE HOSPITAL
Payer: MEDICARE

## 2022-11-14 ENCOUNTER — RESULT REVIEW (OUTPATIENT)
Age: 52
End: 2022-11-14

## 2022-11-14 VITALS
SYSTOLIC BLOOD PRESSURE: 135 MMHG | WEIGHT: 287.04 LBS | DIASTOLIC BLOOD PRESSURE: 79 MMHG | HEART RATE: 100 BPM | TEMPERATURE: 97 F | RESPIRATION RATE: 16 BRPM | HEIGHT: 70 IN | OXYGEN SATURATION: 96 %

## 2022-11-14 DIAGNOSIS — Z41.9 ENCOUNTER FOR PROCEDURE FOR PURPOSES OTHER THAN REMEDYING HEALTH STATE, UNSPECIFIED: Chronic | ICD-10-CM

## 2022-11-14 DIAGNOSIS — Z98.890 OTHER SPECIFIED POSTPROCEDURAL STATES: Chronic | ICD-10-CM

## 2022-11-14 DIAGNOSIS — Z90.49 ACQUIRED ABSENCE OF OTHER SPECIFIED PARTS OF DIGESTIVE TRACT: Chronic | ICD-10-CM

## 2022-11-14 LAB
ALBUMIN SERPL ELPH-MCNC: 3.6 G/DL — SIGNIFICANT CHANGE UP (ref 3.3–5)
ALP SERPL-CCNC: 77 U/L — SIGNIFICANT CHANGE UP (ref 40–120)
ALT FLD-CCNC: 15 U/L — SIGNIFICANT CHANGE UP (ref 10–45)
ANION GAP SERPL CALC-SCNC: 12 MMOL/L — SIGNIFICANT CHANGE UP (ref 5–17)
APTT BLD: 26.1 SEC — LOW (ref 27.5–35.5)
AST SERPL-CCNC: 12 U/L — SIGNIFICANT CHANGE UP (ref 10–40)
BASE EXCESS BLDA CALC-SCNC: 2.5 MMOL/L — SIGNIFICANT CHANGE UP (ref -2–3)
BILIRUB SERPL-MCNC: 0.5 MG/DL — SIGNIFICANT CHANGE UP (ref 0.2–1.2)
BLD GP AB SCN SERPL QL: NEGATIVE — SIGNIFICANT CHANGE UP
BUN SERPL-MCNC: 12 MG/DL — SIGNIFICANT CHANGE UP (ref 7–23)
CA-I BLDA-SCNC: 1.18 MMOL/L — SIGNIFICANT CHANGE UP (ref 1.15–1.33)
CALCIUM SERPL-MCNC: 9.2 MG/DL — SIGNIFICANT CHANGE UP (ref 8.4–10.5)
CHLORIDE SERPL-SCNC: 105 MMOL/L — SIGNIFICANT CHANGE UP (ref 96–108)
CO2 BLDA-SCNC: 27 MMOL/L — HIGH (ref 19–24)
CO2 SERPL-SCNC: 24 MMOL/L — SIGNIFICANT CHANGE UP (ref 22–31)
COHGB MFR BLDA: 0.7 % — SIGNIFICANT CHANGE UP
CREAT SERPL-MCNC: 0.74 MG/DL — SIGNIFICANT CHANGE UP (ref 0.5–1.3)
EGFR: 97 ML/MIN/1.73M2 — SIGNIFICANT CHANGE UP
GLUCOSE BLDA-MCNC: 155 MG/DL — HIGH (ref 70–99)
GLUCOSE BLDC GLUCOMTR-MCNC: 153 MG/DL — HIGH (ref 70–99)
GLUCOSE BLDC GLUCOMTR-MCNC: 163 MG/DL — HIGH (ref 70–99)
GLUCOSE BLDC GLUCOMTR-MCNC: 169 MG/DL — HIGH (ref 70–99)
GLUCOSE SERPL-MCNC: 194 MG/DL — HIGH (ref 70–99)
GRAM STN FLD: SIGNIFICANT CHANGE UP
GRAM STN FLD: SIGNIFICANT CHANGE UP
HCO3 BLDA-SCNC: 26 MMOL/L — SIGNIFICANT CHANGE UP (ref 21–28)
HCT VFR BLD CALC: 39 % — SIGNIFICANT CHANGE UP (ref 34.5–45)
HGB BLD-MCNC: 12.7 G/DL — SIGNIFICANT CHANGE UP (ref 11.5–15.5)
HGB BLDA-MCNC: 13.4 G/DL — SIGNIFICANT CHANGE UP (ref 11.7–16.1)
INR BLD: 1.03 — SIGNIFICANT CHANGE UP (ref 0.88–1.16)
MAGNESIUM SERPL-MCNC: 1.8 MG/DL — SIGNIFICANT CHANGE UP (ref 1.6–2.6)
MCHC RBC-ENTMCNC: 29.1 PG — SIGNIFICANT CHANGE UP (ref 27–34)
MCHC RBC-ENTMCNC: 32.6 GM/DL — SIGNIFICANT CHANGE UP (ref 32–36)
MCV RBC AUTO: 89.4 FL — SIGNIFICANT CHANGE UP (ref 80–100)
METHGB MFR BLDA: 0.5 % — SIGNIFICANT CHANGE UP
NRBC # BLD: 0 /100 WBCS — SIGNIFICANT CHANGE UP (ref 0–0)
OXYHGB MFR BLDA: 97.2 % — HIGH (ref 90–95)
PCO2 BLDA: 37 MMHG — SIGNIFICANT CHANGE UP (ref 32–45)
PH BLDA: 7.46 — HIGH (ref 7.35–7.45)
PHOSPHATE SERPL-MCNC: 4.5 MG/DL — SIGNIFICANT CHANGE UP (ref 2.5–4.5)
PLATELET # BLD AUTO: 358 K/UL — SIGNIFICANT CHANGE UP (ref 150–400)
PO2 BLDA: 118 MMHG — HIGH (ref 83–108)
POTASSIUM BLDA-SCNC: 4.3 MMOL/L — SIGNIFICANT CHANGE UP (ref 3.5–5.1)
POTASSIUM SERPL-MCNC: 4.7 MMOL/L — SIGNIFICANT CHANGE UP (ref 3.5–5.3)
POTASSIUM SERPL-SCNC: 4.7 MMOL/L — SIGNIFICANT CHANGE UP (ref 3.5–5.3)
PROT SERPL-MCNC: 6.5 G/DL — SIGNIFICANT CHANGE UP (ref 6–8.3)
PROTHROM AB SERPL-ACNC: 12.3 SEC — SIGNIFICANT CHANGE UP (ref 10.5–13.4)
RBC # BLD: 4.36 M/UL — SIGNIFICANT CHANGE UP (ref 3.8–5.2)
RBC # FLD: 14.3 % — SIGNIFICANT CHANGE UP (ref 10.3–14.5)
RH IG SCN BLD-IMP: POSITIVE — SIGNIFICANT CHANGE UP
SAO2 % BLDA: 98.4 % — HIGH (ref 94–98)
SODIUM BLDA-SCNC: 135 MMOL/L — LOW (ref 136–145)
SODIUM SERPL-SCNC: 141 MMOL/L — SIGNIFICANT CHANGE UP (ref 135–145)
SPECIMEN SOURCE: SIGNIFICANT CHANGE UP
SPECIMEN SOURCE: SIGNIFICANT CHANGE UP
WBC # BLD: 9.29 K/UL — SIGNIFICANT CHANGE UP (ref 3.8–10.5)
WBC # FLD AUTO: 9.29 K/UL — SIGNIFICANT CHANGE UP (ref 3.8–10.5)

## 2022-11-14 PROCEDURE — 88304 TISSUE EXAM BY PATHOLOGIST: CPT | Mod: 26

## 2022-11-14 PROCEDURE — 99222 1ST HOSP IP/OBS MODERATE 55: CPT

## 2022-11-14 DEVICE — SYNCROMED II INFUSION PUMP 20CC: Type: IMPLANTABLE DEVICE | Status: FUNCTIONAL

## 2022-11-14 DEVICE — CATH ASCENDA INTRATHECAL SHORT: Type: IMPLANTABLE DEVICE | Status: FUNCTIONAL

## 2022-11-14 DEVICE — PATIENT PERSONAL THERAPY MANAGER: Type: IMPLANTABLE DEVICE | Status: FUNCTIONAL

## 2022-11-14 RX ORDER — SODIUM CHLORIDE 9 MG/ML
1000 INJECTION INTRAMUSCULAR; INTRAVENOUS; SUBCUTANEOUS
Refills: 0 | Status: DISCONTINUED | OUTPATIENT
Start: 2022-11-14 | End: 2022-11-16

## 2022-11-14 RX ORDER — METFORMIN HYDROCHLORIDE 850 MG/1
1 TABLET ORAL
Qty: 0 | Refills: 0 | DISCHARGE

## 2022-11-14 RX ORDER — APREPITANT 80 MG/1
1 CAPSULE ORAL
Qty: 0 | Refills: 0 | DISCHARGE

## 2022-11-14 RX ORDER — PROCHLORPERAZINE MALEATE 5 MG
1 TABLET ORAL
Qty: 0 | Refills: 0 | DISCHARGE

## 2022-11-14 RX ORDER — CELECOXIB 200 MG/1
1 CAPSULE ORAL
Qty: 0 | Refills: 0 | DISCHARGE

## 2022-11-14 RX ORDER — PANTOPRAZOLE SODIUM 20 MG/1
1 TABLET, DELAYED RELEASE ORAL
Qty: 0 | Refills: 0 | DISCHARGE

## 2022-11-14 RX ORDER — DICLOFENAC SODIUM 30 MG/G
0 GEL TOPICAL
Qty: 0 | Refills: 0 | DISCHARGE

## 2022-11-14 RX ORDER — GABAPENTIN 400 MG/1
1 CAPSULE ORAL
Qty: 0 | Refills: 0 | DISCHARGE

## 2022-11-14 RX ORDER — HYDROMORPHONE HYDROCHLORIDE 2 MG/ML
0.5 INJECTION INTRAMUSCULAR; INTRAVENOUS; SUBCUTANEOUS
Refills: 0 | Status: COMPLETED | OUTPATIENT
Start: 2022-11-14 | End: 2022-11-15

## 2022-11-14 RX ORDER — FUROSEMIDE 40 MG
1 TABLET ORAL
Qty: 0 | Refills: 0 | DISCHARGE

## 2022-11-14 RX ORDER — HYDROCORTISONE 20 MG
50 TABLET ORAL DAILY
Refills: 0 | Status: DISCONTINUED | OUTPATIENT
Start: 2022-11-14 | End: 2022-11-15

## 2022-11-14 RX ORDER — INSULIN LISPRO 100/ML
0 VIAL (ML) SUBCUTANEOUS
Qty: 0 | Refills: 0 | DISCHARGE

## 2022-11-14 RX ORDER — HYDROCORTISONE 20 MG
1 TABLET ORAL
Qty: 0 | Refills: 0 | DISCHARGE

## 2022-11-14 RX ORDER — DIPHENOXYLATE HCL/ATROPINE 2.5-.025MG
1 TABLET ORAL EVERY 6 HOURS
Refills: 0 | Status: DISCONTINUED | OUTPATIENT
Start: 2022-11-14 | End: 2022-11-16

## 2022-11-14 RX ORDER — ATORVASTATIN CALCIUM 80 MG/1
1 TABLET, FILM COATED ORAL
Qty: 0 | Refills: 0 | DISCHARGE

## 2022-11-14 RX ORDER — NEBIVOLOL HYDROCHLORIDE 5 MG/1
1 TABLET ORAL
Qty: 0 | Refills: 0 | DISCHARGE

## 2022-11-14 RX ORDER — GABAPENTIN 400 MG/1
300 CAPSULE ORAL EVERY 8 HOURS
Refills: 0 | Status: DISCONTINUED | OUTPATIENT
Start: 2022-11-14 | End: 2022-11-16

## 2022-11-14 RX ORDER — LOPERAMIDE HCL 2 MG
2 TABLET ORAL EVERY 6 HOURS
Refills: 0 | Status: DISCONTINUED | OUTPATIENT
Start: 2022-11-14 | End: 2022-11-16

## 2022-11-14 RX ORDER — EZETIMIBE 10 MG/1
1 TABLET ORAL
Qty: 0 | Refills: 0 | DISCHARGE

## 2022-11-14 RX ORDER — LOPERAMIDE HCL 2 MG
1 TABLET ORAL
Qty: 0 | Refills: 0 | DISCHARGE

## 2022-11-14 RX ORDER — PROCHLORPERAZINE MALEATE 5 MG
5 TABLET ORAL EVERY 6 HOURS
Refills: 0 | Status: DISCONTINUED | OUTPATIENT
Start: 2022-11-14 | End: 2022-11-16

## 2022-11-14 RX ORDER — ONDANSETRON 8 MG/1
4 TABLET, FILM COATED ORAL EVERY 8 HOURS
Refills: 0 | Status: DISCONTINUED | OUTPATIENT
Start: 2022-11-14 | End: 2022-11-16

## 2022-11-14 RX ORDER — ATORVASTATIN CALCIUM 80 MG/1
40 TABLET, FILM COATED ORAL AT BEDTIME
Refills: 0 | Status: DISCONTINUED | OUTPATIENT
Start: 2022-11-14 | End: 2022-11-16

## 2022-11-14 RX ORDER — HYDROCORTISONE 20 MG
5 TABLET ORAL DAILY
Refills: 0 | Status: DISCONTINUED | OUTPATIENT
Start: 2022-11-15 | End: 2022-11-15

## 2022-11-14 RX ORDER — ICOSAPENT ETHYL 500 MG/1
2 CAPSULE, LIQUID FILLED ORAL
Qty: 0 | Refills: 0 | DISCHARGE

## 2022-11-14 RX ORDER — CANAGLIFLOZIN 100 MG/1
1 TABLET, FILM COATED ORAL
Qty: 0 | Refills: 0 | DISCHARGE

## 2022-11-14 RX ORDER — PANTOPRAZOLE SODIUM 20 MG/1
40 TABLET, DELAYED RELEASE ORAL
Refills: 0 | Status: DISCONTINUED | OUTPATIENT
Start: 2022-11-14 | End: 2022-11-16

## 2022-11-14 RX ORDER — HYDROCORTISONE 20 MG
20 TABLET ORAL DAILY
Refills: 0 | Status: DISCONTINUED | OUTPATIENT
Start: 2022-11-15 | End: 2022-11-15

## 2022-11-14 RX ORDER — HYDROCORTISONE 20 MG
0 TABLET ORAL
Qty: 0 | Refills: 0 | DISCHARGE

## 2022-11-14 RX ORDER — ONDANSETRON 8 MG/1
1 TABLET, FILM COATED ORAL
Qty: 0 | Refills: 0 | DISCHARGE

## 2022-11-14 RX ORDER — CHOLECALCIFEROL (VITAMIN D3) 125 MCG
1 CAPSULE ORAL
Qty: 0 | Refills: 0 | DISCHARGE

## 2022-11-14 RX ORDER — CELECOXIB 200 MG/1
100 CAPSULE ORAL EVERY 12 HOURS
Refills: 0 | Status: DISCONTINUED | OUTPATIENT
Start: 2022-11-14 | End: 2022-11-16

## 2022-11-14 RX ORDER — GLUCAGON INJECTION, SOLUTION 0.5 MG/.1ML
0 INJECTION, SOLUTION SUBCUTANEOUS
Qty: 0 | Refills: 0 | DISCHARGE

## 2022-11-14 RX ORDER — HYDROMORPHONE HYDROCHLORIDE 2 MG/ML
0.5 INJECTION INTRAMUSCULAR; INTRAVENOUS; SUBCUTANEOUS
Refills: 0 | Status: DISCONTINUED | OUTPATIENT
Start: 2022-11-14 | End: 2022-11-14

## 2022-11-14 RX ORDER — INSULIN LISPRO 100/ML
VIAL (ML) SUBCUTANEOUS
Refills: 0 | Status: DISCONTINUED | OUTPATIENT
Start: 2022-11-14 | End: 2022-11-15

## 2022-11-14 RX ORDER — LANOLIN ALCOHOL/MO/W.PET/CERES
3 CREAM (GRAM) TOPICAL AT BEDTIME
Refills: 0 | Status: DISCONTINUED | OUTPATIENT
Start: 2022-11-14 | End: 2022-11-16

## 2022-11-14 RX ORDER — DICLOFENAC SODIUM 75 MG/1
0 TABLET, DELAYED RELEASE ORAL
Qty: 0 | Refills: 0 | DISCHARGE

## 2022-11-14 RX ORDER — DIPHENOXYLATE HCL/ATROPINE 2.5-.025MG
2 TABLET ORAL
Qty: 0 | Refills: 0 | DISCHARGE

## 2022-11-14 RX ORDER — FUROSEMIDE 40 MG
20 TABLET ORAL DAILY
Refills: 0 | Status: DISCONTINUED | OUTPATIENT
Start: 2022-11-14 | End: 2022-11-16

## 2022-11-14 RX ADMIN — HYDROMORPHONE HYDROCHLORIDE 0.5 MILLIGRAM(S): 2 INJECTION INTRAMUSCULAR; INTRAVENOUS; SUBCUTANEOUS at 23:34

## 2022-11-14 RX ADMIN — SODIUM CHLORIDE 150 MILLILITER(S): 9 INJECTION INTRAMUSCULAR; INTRAVENOUS; SUBCUTANEOUS at 22:37

## 2022-11-14 RX ADMIN — HYDROMORPHONE HYDROCHLORIDE 0.5 MILLIGRAM(S): 2 INJECTION INTRAMUSCULAR; INTRAVENOUS; SUBCUTANEOUS at 23:37

## 2022-11-14 RX ADMIN — HYDROMORPHONE HYDROCHLORIDE 0.5 MILLIGRAM(S): 2 INJECTION INTRAMUSCULAR; INTRAVENOUS; SUBCUTANEOUS at 21:29

## 2022-11-14 NOTE — BRIEF OPERATIVE NOTE - OPERATION/FINDINGS
skin intact at the area of bottom of the pocket with eminent breakage  pump removal with new pocket creation

## 2022-11-14 NOTE — PRE-ANESTHESIA EVALUATION ADULT - NSANTHOSAYNRD_GEN_A_CORE
No. KARLA screening performed.  STOP BANG Legend: 0-2 = LOW Risk; 3-4 = INTERMEDIATE Risk; 5-8 = HIGH Risk
No. KARLA screening performed.  STOP BANG Legend: 0-2 = LOW Risk; 3-4 = INTERMEDIATE Risk; 5-8 = HIGH Risk

## 2022-11-14 NOTE — H&P ADULT - PROBLEM SELECTOR PLAN 3
Patient diagnosed in 2005 with pancreatitis 2/2 triglyceridemia. Suffers from associated chronic nausea, vomiting, and abdominal pain. Home meds: vascepa 2g BID,   - Maintenance fluids  - Regular diet as tolerated  - DM regimen: metformin 1000 BID, invokana 300, Patient diagnosed in 2005 with pancreatitis 2/2 triglyceridemia. Suffers from associated chronic nausea, vomiting, and abdominal pain. Home meds: vascepa 2g BID,   - Maintenance fluids  - Regular diet as tolerated

## 2022-11-14 NOTE — H&P ADULT - PROBLEM SELECTOR PLAN 7
F: None   E: Replete for K<4, Mag<2  N: Regular Diet  A: None  Code status: Full  Dispo: [facility]

## 2022-11-14 NOTE — H&P ADULT - HISTORY OF PRESENT ILLNESS
ED course: none, straight to OR 52F w/ HTN, HLD, GERD, chronic nausea/vomiting/abdominal pain 2/2 pancreatitis 2/2 hypertriglyceridemia (dx 2005 at [facility]) w/ pain pump implanted October 2021, chronic nocturnal fevers of unknown etiology, adrenal insufficiency, and postural orthostatic tachycardia syndrome, who complains of 2-month history of right-sided abdominal pain and fevers, concerning for infection around her PCA pump, who presents to [facility] for pump revision surgery by Dr. Watters. Patient tolerated revision procedure well. Pain pump pocket was modified and cultures of the pump site were collected. Patient admitted to medicine for pain management monitoring post-op. Proceduralist Dr. Watters reports surgical site is at high risk for infection. Patient reports that she has not eaten food in 18 hours, so is currently no experiencing nausea/vomiting/abdominal pain. Denies headaches, chest pain, or shortness of breath.      Post-op:  Initial vital signs: T: XX F, HR: XX, BP: XX, R: XX, SpO2: XX% on RA  Labs: significant for CBC normal, CMP normal, Glc 194  Imaging:  CXR: None  EKG: None  Medications: Dilaudid 0.5  Consults: none  52F w/ HTN, HLD, GERD, chronic nausea/vomiting/abdominal pain 2/2 pancreatitis 2/2 hypertriglyceridemia (dx 2005 at [facility]) w/ pain pump implanted October 2021, chronic nocturnal fevers of unknown etiology, adrenal insufficiency, and postural orthostatic tachycardia syndrome, who complains of 2-month history of right-sided abdominal pain and fevers, concerning for infection around her PCA pump, who presents to [facility] for pump revision surgery by Dr. Watters. Patient tolerated revision procedure well. Pain pump pocket was modified and cultures of the pump site were collected. Patient admitted to medicine for pain management monitoring post-op. Proceduralist Dr. Watters reports surgical site is at high risk for infection. Patient reports that she has not eaten food in 18 hours, so is currently no experiencing nausea/vomiting/abdominal pain. Denies headaches, chest pain, or shortness of breath.      Post-op:  Initial vital signs: T: 98.4 F, HR: 81, BP: 132/63, R: 18, SpO2: 99% on RA  Labs: significant for CBC normal, CMP normal, Glc 194  Imaging:  CXR: None  EKG: None  Medications: Dilaudid 0.5x4  Consults: none

## 2022-11-14 NOTE — H&P ADULT - PROBLEM SELECTOR PLAN 1
Patient presents with pain in right-abdominal pain pump ongoing for 2 months, now s/p revision of pump and placement in abdomen. Inserted in October 2021 initially. First revision surgery this admission. Admitted to medicine for post-op monitoring for infection.  - Pain management per medtronic pain pump  - Ciprofloxacin 600 q6 for post-op infection prevention  - Follow-up surgical swab cultures

## 2022-11-14 NOTE — H&P ADULT - ASSESSMENT
52F w/ HTN, HLD, GERD, chronic nausea/vomiting/abdominal pain 2/2 pancreatitis 2/2 hypertriglyceridemia (dx 2005 at [facility]) w/ pain pump implanted October 2021, chronic nocturnal fevers of unknown etiology, adrenal insufficiency, and postural orthostatic tachycardia syndrome, who complains of 2-month history of right-sided abdominal pain and fevers, concerning for infection around her PCA pump, who presents to [facility] for pump revision surgery by Dr. Watters, admitted for post-op monitoring.

## 2022-11-14 NOTE — H&P ADULT - PROBLEM SELECTOR PLAN 4
Home meds: insulin sliding scale, metformin 1000 BID, invokana 300  - F/u A1c in AM  - mISS inpatient

## 2022-11-14 NOTE — H&P ADULT - ATTENDING COMMENTS
I have seen and examined the patient in addition to discussed in detail with the housestaff. I agree with the contents of the housestaff's note with the following additions:     52 year old female with PMHx HTN, HLD, GERD, chronic nausea/vomiting/abdominal pain 2/2 pancreatitis 2/2 hypertriglyceridemia, Pipestem's disease admitted to [facility] for pump revision surgery.    VSS, afebrile, normotensive   PE benign  Labs electrolytes wnl, Hgb stable, no leukocytosis    A/P:  1. Pump revision surgery: cipro. Pain control. Infection monitoring post op  2. Chronic pancreatitis: continue home meds  3. David's disease: hydrocortisone  4. Chronic medical conditions: continue home meds

## 2022-11-14 NOTE — H&P ADULT - NSICDXPASTSURGICALHX_GEN_ALL_CORE_FT
PAST SURGICAL HISTORY:  Elective surgery intrathecal pump    H/O dilation and curettage     H/O oral surgery     History of hand surgery     History of laparoscopic cholecystectomy     History of sphincterotomy of sphincter of Oddi

## 2022-11-14 NOTE — H&P ADULT - NSHPOUTPATIENTPROVIDERS_GEN_ALL_CORE
[facility] Outpatient Providers  Dr. Kane Mckeon [phone]  Dr. Nydia Mccarthy Gastroenterology [phone]  Dr. Zuri Kulkarni Gastroenterology/Gastric Motility [phone]  Dr. Bucky Montoya Endocrinology [phone]  Dr. Scarlet Sanchez Rheumatology [phone]  Dr. Norman Nash Cardiology [phone]  Dr. [facility] [phone]    Non-[facility] Outpatient Providers:  [facility]. [facility]  Pain Management [phone]   Dir. Flavio Denney Infectious Disease [phone]  Dr. Servando Hardwick Otolaryngology [phone]  Dr. Nitza Bee Gynecology [phone]  Dr. Saurabh Aguiar Dermatology [phone]  Dr. Safia Fitch ([place]) Opthamology [phone]  Dr. Bucky Cuevas Pulmonology [phone]

## 2022-11-14 NOTE — H&P ADULT - NSHPPHYSICALEXAM_GEN_ALL_CORE
.  VITAL SIGNS:  T(C): 36.1 (11-14-22 @ 22:18), Max: 36.1 (11-14-22 @ 14:03)  T(F): 97 (11-14-22 @ 22:18), Max: 97 (11-14-22 @ 14:03)  HR: 82 (11-14-22 @ 23:58) (75 - 100)  BP: 126/88 (11-14-22 @ 23:58) (122/64 - 165/72)  BP(mean): 101 (11-14-22 @ 23:58) (58 - 104)  RR: 15 (11-14-22 @ 23:58) (15 - 18)  SpO2: 98% (11-14-22 @ 23:58) (96% - 98%)  Wt(kg): --    PHYSICAL EXAM:    General: Morbidly obese  HEENT: NC/AT; PERRL, anicteric sclera; MMM  Neck: supple  Cardiovascular: +S1/S2; RRR  Respiratory: CTA B/L; no W/R/R  Gastrointestinal: soft, NT/ND; +BSx4  Extremities: WWP; no edema, clubbing or cyanosis  Vascular: 2+ radial, DP/PT pulses B/L  Neurological: AAOx3; no focal deficits .  VITAL SIGNS:  T(C): 36.1 (11-14-22 @ 22:18), Max: 36.1 (11-14-22 @ 14:03)  T(F): 97 (11-14-22 @ 22:18), Max: 97 (11-14-22 @ 14:03)  HR: 82 (11-14-22 @ 23:58) (75 - 100)  BP: 126/88 (11-14-22 @ 23:58) (122/64 - 165/72)  BP(mean): 101 (11-14-22 @ 23:58) (58 - 104)  RR: 15 (11-14-22 @ 23:58) (15 - 18)  SpO2: 98% (11-14-22 @ 23:58) (96% - 98%)  Wt(kg): --    PHYSICAL EXAM:    General: Morbidly obese  HEENT: NC/AT; PERRL, anicteric sclera; MMM  Neck: supple  Cardiovascular: +S1/S2; RRR  Respiratory: CTA B/L; no W/R/R  Gastrointestinal: soft, NT/ND; +BSx4; right hip surgical site dressing clean, dry, intact  Extremities: WWP; no edema, clubbing or cyanosis  Vascular: 2+ radial, DP/PT pulses B/L  Neurological: AAOx3; no focal deficits

## 2022-11-14 NOTE — H&P ADULT - NSHPLABSRESULTS_GEN_ALL_CORE
.  LABS:                         12.7   9.29  )-----------( 358      ( 14 Nov 2022 22:14 )             39.0     11-14    141  |  105  |  12  ----------------------------<  194<H>  4.7   |  24  |  0.74    Ca    9.2      14 Nov 2022 22:14  Phos  4.5     11-14  Mg     1.8     11-14    TPro  6.5  /  Alb  3.6  /  TBili  0.5  /  DBili  x   /  AST  12  /  ALT  15  /  AlkPhos  77  11-14    PT/INR - ( 14 Nov 2022 22:14 )   PT: 12.3 sec;   INR: 1.03          PTT - ( 14 Nov 2022 22:14 )  PTT:26.1 sec              RADIOLOGY, EKG & ADDITIONAL TESTS: Reviewed.

## 2022-11-14 NOTE — H&P ADULT - PROBLEM SELECTOR PLAN 5
Home med: hydrocortisone 20mg AM and 5mg PM.  - C/w home hydrocortisone PO  - Start solucortef IV 50 q8

## 2022-11-14 NOTE — H&P ADULT - PROBLEM SELECTOR PLAN 2
Patient suffers from chronic nausea, vomiting, and abdominal pain 2/2 pancreatitis. Home meds: protonix 40, zofran ODT 8mg PRN, compazine 5 q6, motillium 10 q8, arepitant 80 daily PRN, immodium 2mg q6 PRN, lomotil 2.5/0.025 q6 PRN, celebrex 100 q6, gabapentin 300 TID.  - Restart home medications  - Regular diet as tolerated

## 2022-11-14 NOTE — BRIEF OPERATIVE NOTE - NSICDXBRIEFPROCEDURE_GEN_ALL_CORE_FT
PROCEDURES:  Revision of pain pump 14-Nov-2022 20:49:24  Do Brian Scherer  Removal, pain pump 14-Nov-2022 20:49:42  Do Brian Scherer  Revision of intrathecal catheter 14-Nov-2022 20:49:53  Do Brian Scherer  Puncture, shunt tubing or reservoir 14-Nov-2022 20:50:08  Do Brian Scherer  Adjacent tissue transfer of buttock, defect size 5.1 to 10.0 sq cm 14-Nov-2022 20:50:31  Do Brian Scherer

## 2022-11-14 NOTE — H&P ADULT - NSICDXPASTMEDICALHX_GEN_ALL_CORE_FT
PAST MEDICAL HISTORY:  David's disease     Asthma     Chronic nausea     Chronic pain syndrome     Diabetes Insulin pump    Gastroparesis     GERD (gastroesophageal reflux disease)     History of arthritis     History of chronic gastritis     History of chronic pancreatitis     HTN (hypertension)     HTN (hypertension)     Myocardial infarction silent    Nonalcoholic fatty liver disease

## 2022-11-15 DIAGNOSIS — E11.9 TYPE 2 DIABETES MELLITUS WITHOUT COMPLICATIONS: ICD-10-CM

## 2022-11-15 DIAGNOSIS — R11.2 NAUSEA WITH VOMITING, UNSPECIFIED: ICD-10-CM

## 2022-11-15 DIAGNOSIS — R63.8 OTHER SYMPTOMS AND SIGNS CONCERNING FOOD AND FLUID INTAKE: ICD-10-CM

## 2022-11-15 DIAGNOSIS — Z87.19 PERSONAL HISTORY OF OTHER DISEASES OF THE DIGESTIVE SYSTEM: ICD-10-CM

## 2022-11-15 DIAGNOSIS — E27.1 PRIMARY ADRENOCORTICAL INSUFFICIENCY: ICD-10-CM

## 2022-11-15 DIAGNOSIS — Z98.890 OTHER SPECIFIED POSTPROCEDURAL STATES: ICD-10-CM

## 2022-11-15 DIAGNOSIS — I10 ESSENTIAL (PRIMARY) HYPERTENSION: ICD-10-CM

## 2022-11-15 LAB
A1C WITH ESTIMATED AVERAGE GLUCOSE RESULT: 6 % — HIGH (ref 4–5.6)
ALBUMIN SERPL ELPH-MCNC: 3.9 G/DL — SIGNIFICANT CHANGE UP (ref 3.3–5)
ALP SERPL-CCNC: 81 U/L — SIGNIFICANT CHANGE UP (ref 40–120)
ALT FLD-CCNC: SIGNIFICANT CHANGE UP (ref 10–45)
ANION GAP SERPL CALC-SCNC: 14 MMOL/L — SIGNIFICANT CHANGE UP (ref 5–17)
AST SERPL-CCNC: SIGNIFICANT CHANGE UP (ref 10–40)
BASOPHILS # BLD AUTO: 0.03 K/UL — SIGNIFICANT CHANGE UP (ref 0–0.2)
BASOPHILS NFR BLD AUTO: 0.3 % — SIGNIFICANT CHANGE UP (ref 0–2)
BILIRUB SERPL-MCNC: 0.6 MG/DL — SIGNIFICANT CHANGE UP (ref 0.2–1.2)
BUN SERPL-MCNC: 11 MG/DL — SIGNIFICANT CHANGE UP (ref 7–23)
CALCIUM SERPL-MCNC: 9.2 MG/DL — SIGNIFICANT CHANGE UP (ref 8.4–10.5)
CHLORIDE SERPL-SCNC: 102 MMOL/L — SIGNIFICANT CHANGE UP (ref 96–108)
CHOLEST SERPL-MCNC: 146 MG/DL — SIGNIFICANT CHANGE UP
CO2 SERPL-SCNC: 17 MMOL/L — LOW (ref 22–31)
CREAT SERPL-MCNC: 0.63 MG/DL — SIGNIFICANT CHANGE UP (ref 0.5–1.3)
EGFR: 106 ML/MIN/1.73M2 — SIGNIFICANT CHANGE UP
EOSINOPHIL # BLD AUTO: 0 K/UL — SIGNIFICANT CHANGE UP (ref 0–0.5)
EOSINOPHIL NFR BLD AUTO: 0 % — SIGNIFICANT CHANGE UP (ref 0–6)
ESTIMATED AVERAGE GLUCOSE: 126 MG/DL — HIGH (ref 68–114)
GLUCOSE BLDC GLUCOMTR-MCNC: 139 MG/DL — HIGH (ref 70–99)
GLUCOSE BLDC GLUCOMTR-MCNC: 151 MG/DL — HIGH (ref 70–99)
GLUCOSE BLDC GLUCOMTR-MCNC: 196 MG/DL — HIGH (ref 70–99)
GLUCOSE SERPL-MCNC: 160 MG/DL — HIGH (ref 70–99)
HCT VFR BLD CALC: 43.2 % — SIGNIFICANT CHANGE UP (ref 34.5–45)
HDLC SERPL-MCNC: 50 MG/DL — LOW
HGB BLD-MCNC: 14.1 G/DL — SIGNIFICANT CHANGE UP (ref 11.5–15.5)
IMM GRANULOCYTES NFR BLD AUTO: 0.6 % — SIGNIFICANT CHANGE UP (ref 0–0.9)
LIDOCAIN IGE QN: 14 U/L — SIGNIFICANT CHANGE UP (ref 7–60)
LIPID PNL WITH DIRECT LDL SERPL: 70 MG/DL — SIGNIFICANT CHANGE UP
LYMPHOCYTES # BLD AUTO: 1.28 K/UL — SIGNIFICANT CHANGE UP (ref 1–3.3)
LYMPHOCYTES # BLD AUTO: 11.4 % — LOW (ref 13–44)
MAGNESIUM SERPL-MCNC: 1.9 MG/DL — SIGNIFICANT CHANGE UP (ref 1.6–2.6)
MCHC RBC-ENTMCNC: 29.3 PG — SIGNIFICANT CHANGE UP (ref 27–34)
MCHC RBC-ENTMCNC: 32.6 GM/DL — SIGNIFICANT CHANGE UP (ref 32–36)
MCV RBC AUTO: 89.8 FL — SIGNIFICANT CHANGE UP (ref 80–100)
MONOCYTES # BLD AUTO: 0.55 K/UL — SIGNIFICANT CHANGE UP (ref 0–0.9)
MONOCYTES NFR BLD AUTO: 4.9 % — SIGNIFICANT CHANGE UP (ref 2–14)
NEUTROPHILS # BLD AUTO: 9.25 K/UL — HIGH (ref 1.8–7.4)
NEUTROPHILS NFR BLD AUTO: 82.8 % — HIGH (ref 43–77)
NON HDL CHOLESTEROL: 96 MG/DL — SIGNIFICANT CHANGE UP
NRBC # BLD: 0 /100 WBCS — SIGNIFICANT CHANGE UP (ref 0–0)
PHOSPHATE SERPL-MCNC: 3.9 MG/DL — SIGNIFICANT CHANGE UP (ref 2.5–4.5)
PLATELET # BLD AUTO: 420 K/UL — HIGH (ref 150–400)
POTASSIUM SERPL-MCNC: SIGNIFICANT CHANGE UP (ref 3.5–5.3)
POTASSIUM SERPL-SCNC: SIGNIFICANT CHANGE UP (ref 3.5–5.3)
PROT SERPL-MCNC: 7.4 G/DL — SIGNIFICANT CHANGE UP (ref 6–8.3)
RBC # BLD: 4.81 M/UL — SIGNIFICANT CHANGE UP (ref 3.8–5.2)
RBC # FLD: 14.4 % — SIGNIFICANT CHANGE UP (ref 10.3–14.5)
SODIUM SERPL-SCNC: 133 MMOL/L — LOW (ref 135–145)
TRIGL SERPL-MCNC: 130 MG/DL — SIGNIFICANT CHANGE UP
WBC # BLD: 11.18 K/UL — HIGH (ref 3.8–10.5)
WBC # FLD AUTO: 11.18 K/UL — HIGH (ref 3.8–10.5)

## 2022-11-15 PROCEDURE — 99232 SBSQ HOSP IP/OBS MODERATE 35: CPT | Mod: GC

## 2022-11-15 RX ORDER — HYDROCORTISONE 20 MG
50 TABLET ORAL EVERY 8 HOURS
Refills: 0 | Status: DISCONTINUED | OUTPATIENT
Start: 2022-11-15 | End: 2022-11-15

## 2022-11-15 RX ORDER — TRIMETHOPRIM HYDROCHLORIDE 50 MG/5ML
100 SOLUTION ORAL EVERY 8 HOURS
Refills: 0 | Status: DISCONTINUED | OUTPATIENT
Start: 2022-11-15 | End: 2022-11-15

## 2022-11-15 RX ORDER — HYDROCORTISONE 20 MG
20 TABLET ORAL
Refills: 0 | Status: DISCONTINUED | OUTPATIENT
Start: 2022-11-16 | End: 2022-11-16

## 2022-11-15 RX ORDER — HYDROMORPHONE HYDROCHLORIDE 2 MG/ML
0.5 INJECTION INTRAMUSCULAR; INTRAVENOUS; SUBCUTANEOUS ONCE
Refills: 0 | Status: DISCONTINUED | OUTPATIENT
Start: 2022-11-15 | End: 2022-11-15

## 2022-11-15 RX ORDER — HYDROCORTISONE 20 MG
5 TABLET ORAL
Refills: 0 | Status: DISCONTINUED | OUTPATIENT
Start: 2022-11-16 | End: 2022-11-16

## 2022-11-15 RX ORDER — ENOXAPARIN SODIUM 100 MG/ML
40 INJECTION SUBCUTANEOUS EVERY 12 HOURS
Refills: 0 | Status: DISCONTINUED | OUTPATIENT
Start: 2022-11-15 | End: 2022-11-16

## 2022-11-15 RX ORDER — HYDROCORTISONE 20 MG
20 TABLET ORAL ONCE
Refills: 0 | Status: COMPLETED | OUTPATIENT
Start: 2022-11-15 | End: 2022-11-15

## 2022-11-15 RX ORDER — HYDROCORTISONE 20 MG
5 TABLET ORAL DAILY
Refills: 0 | Status: DISCONTINUED | OUTPATIENT
Start: 2022-11-15 | End: 2022-11-15

## 2022-11-15 RX ORDER — HYDROCORTISONE 20 MG
5 TABLET ORAL ONCE
Refills: 0 | Status: COMPLETED | OUTPATIENT
Start: 2022-11-15 | End: 2022-11-15

## 2022-11-15 RX ORDER — ERYTHROMYCIN ETHYLSUCCINATE 400 MG
500 TABLET ORAL EVERY 8 HOURS
Refills: 0 | Status: DISCONTINUED | OUTPATIENT
Start: 2022-11-15 | End: 2022-11-16

## 2022-11-15 RX ADMIN — HYDROMORPHONE HYDROCHLORIDE 0.5 MILLIGRAM(S): 2 INJECTION INTRAMUSCULAR; INTRAVENOUS; SUBCUTANEOUS at 00:23

## 2022-11-15 RX ADMIN — CELECOXIB 100 MILLIGRAM(S): 200 CAPSULE ORAL at 17:44

## 2022-11-15 RX ADMIN — Medication 5 MILLIGRAM(S): at 17:44

## 2022-11-15 RX ADMIN — Medication 1 TABLET(S): at 17:52

## 2022-11-15 RX ADMIN — Medication 5 MILLIGRAM(S): at 22:53

## 2022-11-15 RX ADMIN — GABAPENTIN 300 MILLIGRAM(S): 400 CAPSULE ORAL at 14:15

## 2022-11-15 RX ADMIN — HYDROMORPHONE HYDROCHLORIDE 0.5 MILLIGRAM(S): 2 INJECTION INTRAMUSCULAR; INTRAVENOUS; SUBCUTANEOUS at 02:52

## 2022-11-15 RX ADMIN — CELECOXIB 100 MILLIGRAM(S): 200 CAPSULE ORAL at 07:20

## 2022-11-15 RX ADMIN — Medication 500 MILLIGRAM(S): at 23:01

## 2022-11-15 RX ADMIN — Medication 20 MILLIGRAM(S): at 15:40

## 2022-11-15 RX ADMIN — Medication 50 MILLIGRAM(S): at 17:45

## 2022-11-15 RX ADMIN — Medication 100 MILLIGRAM(S): at 23:00

## 2022-11-15 RX ADMIN — HYDROMORPHONE HYDROCHLORIDE 0.5 MILLIGRAM(S): 2 INJECTION INTRAMUSCULAR; INTRAVENOUS; SUBCUTANEOUS at 04:23

## 2022-11-15 RX ADMIN — Medication 100 MILLIGRAM(S): at 16:29

## 2022-11-15 RX ADMIN — Medication 2 MILLIGRAM(S): at 17:44

## 2022-11-15 RX ADMIN — ENOXAPARIN SODIUM 40 MILLIGRAM(S): 100 INJECTION SUBCUTANEOUS at 05:53

## 2022-11-15 RX ADMIN — Medication 100 MILLIGRAM(S): at 16:23

## 2022-11-15 RX ADMIN — GABAPENTIN 300 MILLIGRAM(S): 400 CAPSULE ORAL at 05:53

## 2022-11-15 RX ADMIN — HYDROMORPHONE HYDROCHLORIDE 0.5 MILLIGRAM(S): 2 INJECTION INTRAMUSCULAR; INTRAVENOUS; SUBCUTANEOUS at 18:57

## 2022-11-15 RX ADMIN — Medication 5 MILLIGRAM(S): at 05:53

## 2022-11-15 RX ADMIN — ENOXAPARIN SODIUM 40 MILLIGRAM(S): 100 INJECTION SUBCUTANEOUS at 17:44

## 2022-11-15 RX ADMIN — CELECOXIB 100 MILLIGRAM(S): 200 CAPSULE ORAL at 18:47

## 2022-11-15 RX ADMIN — GABAPENTIN 300 MILLIGRAM(S): 400 CAPSULE ORAL at 22:53

## 2022-11-15 RX ADMIN — Medication 5 MILLIGRAM(S): at 00:18

## 2022-11-15 RX ADMIN — ATORVASTATIN CALCIUM 40 MILLIGRAM(S): 80 TABLET, FILM COATED ORAL at 22:53

## 2022-11-15 RX ADMIN — Medication 50 MILLIGRAM(S): at 01:09

## 2022-11-15 RX ADMIN — Medication 5 MILLIGRAM(S): at 12:47

## 2022-11-15 RX ADMIN — CELECOXIB 100 MILLIGRAM(S): 200 CAPSULE ORAL at 06:08

## 2022-11-15 RX ADMIN — HYDROMORPHONE HYDROCHLORIDE 0.5 MILLIGRAM(S): 2 INJECTION INTRAMUSCULAR; INTRAVENOUS; SUBCUTANEOUS at 19:16

## 2022-11-15 NOTE — PROGRESS NOTE ADULT - PROBLEM SELECTOR PLAN 1
Patient presents with pain in right-abdominal pain pump ongoing for 2 months, now s/p revision of pump and placement in abdomen. Inserted in October 2021 initially. First revision surgery this admission. Admitted to medicine for post-op monitoring for infection.  - Pain management per medtronic pain pump  - Clindamycin 600mg q6h x 3 for post-op infection prevention  - Follow-up surgical swab cultures

## 2022-11-15 NOTE — OCCUPATIONAL THERAPY INITIAL EVALUATION ADULT - PERTINENT HX OF CURRENT PROBLEM, REHAB EVAL
Pt is a 53 yo female who presents to [facility] for pump revision surgery by Dr. Watters. s/p revision of pain pump on 11/14/2022.

## 2022-11-15 NOTE — PHYSICAL THERAPY INITIAL EVALUATION ADULT - PERTINENT HX OF CURRENT PROBLEM, REHAB EVAL
Pt is a 53 yo female who presents to [facility] for pump revision surgery by Dr. Watters. s/p revision of pain pump on 11/14/2022

## 2022-11-15 NOTE — PROGRESS NOTE ADULT - SUBJECTIVE AND OBJECTIVE BOX
** INCOMPLETE **    OVERNIGHT EVENTS:     SUBJECTIVE:    VITAL SIGNS:  Vital Signs Last 24 Hrs  T(C): 36.7 (15 Nov 2022 10:48), Max: 37.3 (15 Nov 2022 05:38)  T(F): 98 (15 Nov 2022 10:48), Max: 99.2 (15 Nov 2022 05:38)  HR: 75 (15 Nov 2022 10:48) (75 - 100)  BP: 131/69 (15 Nov 2022 10:48) (122/64 - 165/72)  BP(mean): 108 (15 Nov 2022 08:30) (58 - 108)  RR: 16 (15 Nov 2022 10:48) (15 - 18)  SpO2: 98% (15 Nov 2022 10:48) (96% - 99%)    Parameters below as of 15 Nov 2022 08:30  Patient On (Oxygen Delivery Method): room air        PHYSICAL EXAM:  General: NAD; speaking in full sentences  HEENT: NC/AT; PERRL; EOMI; MMM  Neck: supple; no JVD  Cardiac: RRR; +S1/S2  Pulm: CTA B/L; no W/R/R  GI: soft, NT/ND, +BS  Extremities: WWP; no edema, clubbing or cyanosis  Vasc: 2+ radial, DP pulses B/L  Neuro: AAOx3; no focal deficits    MEDICATIONS:  MEDICATIONS  (STANDING):  atorvastatin 40 milliGRAM(s) Oral at bedtime  celecoxib 100 milliGRAM(s) Oral every 12 hours  clindamycin IVPB 600 milliGRAM(s) IV Intermittent every 6 hours  diphenoxylate/atropine 1 Tablet(s) Oral every 6 hours  enoxaparin Injectable 40 milliGRAM(s) SubCutaneous every 12 hours  gabapentin 300 milliGRAM(s) Oral every 8 hours  hydrocortisone 20 milliGRAM(s) Oral once  hydrocortisone sodium succinate Injectable 50 milliGRAM(s) IV Push every 8 hours  Hydromorphone 6mg/ml 120 milliGRAM(s) 120 milliGRAM(s) IntraThecal once  influenza   Vaccine 0.5 milliLiter(s) IntraMuscular once  insulin lispro (ADMELOG) corrective regimen sliding scale   SubCutaneous Before meals and at bedtime  pantoprazole    Tablet 40 milliGRAM(s) Oral before breakfast  prochlorperazine   Tablet 5 milliGRAM(s) Oral every 6 hours  sodium chloride 0.9%. 1000 milliLiter(s) (150 mL/Hr) IV Continuous <Continuous>    MEDICATIONS  (PRN):  aluminum hydroxide/magnesium hydroxide/simethicone Suspension 30 milliLiter(s) Oral every 4 hours PRN Dyspepsia  erythromycin     enteric coated 500 milliGRAM(s) Oral every 8 hours PRN Nausea/Vomiting  furosemide    Tablet 20 milliGRAM(s) Oral daily PRN Swelling  loperamide 2 milliGRAM(s) Oral every 6 hours PRN Diarrhea  melatonin 3 milliGRAM(s) Oral at bedtime PRN Insomnia  ondansetron Injectable 4 milliGRAM(s) IV Push every 8 hours PRN Nausea and/or Vomiting      ALLERGIES:  Allergies    amoxicillin (Unknown)  penicillin (Unknown)  Reglan (Unknown)  Topamax (Unknown)    Intolerances    pt intolerant to ETOH due to history of pancreatitis (Unknown)  Tylenol (Unknown)      LABS:                        14.1   11.18 )-----------( 420      ( 15 Nov 2022 05:27 )             43.2     11-15    133<L>  |  102  |  11  ----------------------------<  160<H>  See Note   |  17<L>  |  0.63    Ca    9.2      15 Nov 2022 05:27  Phos  3.9     11-15  Mg     1.9     11-15    TPro  7.4  /  Alb  3.9  /  TBili  0.6  /  DBili  x   /  AST  See Note  /  ALT  See Note  /  AlkPhos  81  11-15    PT/INR - ( 14 Nov 2022 22:14 )   PT: 12.3 sec;   INR: 1.03          PTT - ( 14 Nov 2022 22:14 )  PTT:26.1 sec    RADIOLOGY & ADDITIONAL TESTS: Reviewed.   INTERVAL HX: Pt stable in PACU s/p removal of pain pump and replacement with smaller device. Pt is     VITAL SIGNS:  Vital Signs Last 24 Hrs  T(C): 36.7 (15 Nov 2022 10:48), Max: 37.3 (15 Nov 2022 05:38)  T(F): 98 (15 Nov 2022 10:48), Max: 99.2 (15 Nov 2022 05:38)  HR: 75 (15 Nov 2022 10:48) (75 - 100)  BP: 131/69 (15 Nov 2022 10:48) (122/64 - 165/72)  BP(mean): 108 (15 Nov 2022 08:30) (58 - 108)  RR: 16 (15 Nov 2022 10:48) (15 - 18)  SpO2: 98% (15 Nov 2022 10:48) (96% - 99%)    Parameters below as of 15 Nov 2022 08:30  Patient On (Oxygen Delivery Method): room air        PHYSICAL EXAM:  General: NAD; speaking in full sentences  HEENT: NC/AT; PERRL; EOMI; MMM  Neck: supple; no JVD  Cardiac: RRR; +S1/S2  Pulm: CTA B/L; no W/R/R  GI: soft, NT/ND, +BS  Extremities: WWP; no edema, clubbing or cyanosis  Vasc: 2+ radial, DP pulses B/L  Neuro: AAOx3; no focal deficits    MEDICATIONS:  MEDICATIONS  (STANDING):  atorvastatin 40 milliGRAM(s) Oral at bedtime  celecoxib 100 milliGRAM(s) Oral every 12 hours  clindamycin IVPB 600 milliGRAM(s) IV Intermittent every 6 hours  diphenoxylate/atropine 1 Tablet(s) Oral every 6 hours  enoxaparin Injectable 40 milliGRAM(s) SubCutaneous every 12 hours  gabapentin 300 milliGRAM(s) Oral every 8 hours  hydrocortisone 20 milliGRAM(s) Oral once  hydrocortisone sodium succinate Injectable 50 milliGRAM(s) IV Push every 8 hours  Hydromorphone 6mg/ml 120 milliGRAM(s) 120 milliGRAM(s) IntraThecal once  influenza   Vaccine 0.5 milliLiter(s) IntraMuscular once  insulin lispro (ADMELOG) corrective regimen sliding scale   SubCutaneous Before meals and at bedtime  pantoprazole    Tablet 40 milliGRAM(s) Oral before breakfast  prochlorperazine   Tablet 5 milliGRAM(s) Oral every 6 hours  sodium chloride 0.9%. 1000 milliLiter(s) (150 mL/Hr) IV Continuous <Continuous>    MEDICATIONS  (PRN):  aluminum hydroxide/magnesium hydroxide/simethicone Suspension 30 milliLiter(s) Oral every 4 hours PRN Dyspepsia  erythromycin     enteric coated 500 milliGRAM(s) Oral every 8 hours PRN Nausea/Vomiting  furosemide    Tablet 20 milliGRAM(s) Oral daily PRN Swelling  loperamide 2 milliGRAM(s) Oral every 6 hours PRN Diarrhea  melatonin 3 milliGRAM(s) Oral at bedtime PRN Insomnia  ondansetron Injectable 4 milliGRAM(s) IV Push every 8 hours PRN Nausea and/or Vomiting      ALLERGIES:  Allergies    amoxicillin (Unknown)  penicillin (Unknown)  Reglan (Unknown)  Topamax (Unknown)    Intolerances    pt intolerant to ETOH due to history of pancreatitis (Unknown)  Tylenol (Unknown)      LABS:                        14.1   11.18 )-----------( 420      ( 15 Nov 2022 05:27 )             43.2     11-15    133<L>  |  102  |  11  ----------------------------<  160<H>  See Note   |  17<L>  |  0.63    Ca    9.2      15 Nov 2022 05:27  Phos  3.9     11-15  Mg     1.9     11-15    TPro  7.4  /  Alb  3.9  /  TBili  0.6  /  DBili  x   /  AST  See Note  /  ALT  See Note  /  AlkPhos  81  11-15    PT/INR - ( 14 Nov 2022 22:14 )   PT: 12.3 sec;   INR: 1.03          PTT - ( 14 Nov 2022 22:14 )  PTT:26.1 sec    RADIOLOGY & ADDITIONAL TESTS: Reviewed.   INTERVAL HX: Pt stable in PACU s/p removal of pain pump and replacement with smaller device. Pt is stable, has no active complaints. Denies N/V at the time. Denies F/C, abdominal pain.     VITAL SIGNS:  Vital Signs Last 24 Hrs  T(C): 36.7 (15 Nov 2022 10:48), Max: 37.3 (15 Nov 2022 05:38)  T(F): 98 (15 Nov 2022 10:48), Max: 99.2 (15 Nov 2022 05:38)  HR: 75 (15 Nov 2022 10:48) (75 - 100)  BP: 131/69 (15 Nov 2022 10:48) (122/64 - 165/72)  BP(mean): 108 (15 Nov 2022 08:30) (58 - 108)  RR: 16 (15 Nov 2022 10:48) (15 - 18)  SpO2: 98% (15 Nov 2022 10:48) (96% - 99%)    Parameters below as of 15 Nov 2022 08:30  Patient On (Oxygen Delivery Method): room air        PHYSICAL EXAM:  General: NAD; speaking in full sentences, obese  HEENT: NC/AT; PERRL; EOMI; MMM  Neck: supple; no JVD  Cardiac: RRR; +S1/S2  Pulm: CTA B/L; no W/R/R  GI: soft, NT/ND, +BS  Extremities: WWP; no edema, clubbing or cyanosis, surgical dressing R hip/gluteal region  Vasc: 2+ radial, DP pulses B/L  Neuro: AAOx3; no focal deficits    MEDICATIONS:  MEDICATIONS  (STANDING):  atorvastatin 40 milliGRAM(s) Oral at bedtime  celecoxib 100 milliGRAM(s) Oral every 12 hours  clindamycin IVPB 600 milliGRAM(s) IV Intermittent every 6 hours  diphenoxylate/atropine 1 Tablet(s) Oral every 6 hours  enoxaparin Injectable 40 milliGRAM(s) SubCutaneous every 12 hours  gabapentin 300 milliGRAM(s) Oral every 8 hours  hydrocortisone 20 milliGRAM(s) Oral once  hydrocortisone sodium succinate Injectable 50 milliGRAM(s) IV Push every 8 hours  Hydromorphone 6mg/ml 120 milliGRAM(s) 120 milliGRAM(s) IntraThecal once  influenza   Vaccine 0.5 milliLiter(s) IntraMuscular once  insulin lispro (ADMELOG) corrective regimen sliding scale   SubCutaneous Before meals and at bedtime  pantoprazole    Tablet 40 milliGRAM(s) Oral before breakfast  prochlorperazine   Tablet 5 milliGRAM(s) Oral every 6 hours  sodium chloride 0.9%. 1000 milliLiter(s) (150 mL/Hr) IV Continuous <Continuous>    MEDICATIONS  (PRN):  aluminum hydroxide/magnesium hydroxide/simethicone Suspension 30 milliLiter(s) Oral every 4 hours PRN Dyspepsia  erythromycin     enteric coated 500 milliGRAM(s) Oral every 8 hours PRN Nausea/Vomiting  furosemide    Tablet 20 milliGRAM(s) Oral daily PRN Swelling  loperamide 2 milliGRAM(s) Oral every 6 hours PRN Diarrhea  melatonin 3 milliGRAM(s) Oral at bedtime PRN Insomnia  ondansetron Injectable 4 milliGRAM(s) IV Push every 8 hours PRN Nausea and/or Vomiting      ALLERGIES:  Allergies    amoxicillin (Unknown)  penicillin (Unknown)  Reglan (Unknown)  Topamax (Unknown)    Intolerances    pt intolerant to ETOH due to history of pancreatitis (Unknown)  Tylenol (Unknown)      LABS:                        14.1   11.18 )-----------( 420      ( 15 Nov 2022 05:27 )             43.2     11-15    133<L>  |  102  |  11  ----------------------------<  160<H>  See Note   |  17<L>  |  0.63    Ca    9.2      15 Nov 2022 05:27  Phos  3.9     11-15  Mg     1.9     11-15    TPro  7.4  /  Alb  3.9  /  TBili  0.6  /  DBili  x   /  AST  See Note  /  ALT  See Note  /  AlkPhos  81  11-15    PT/INR - ( 14 Nov 2022 22:14 )   PT: 12.3 sec;   INR: 1.03          PTT - ( 14 Nov 2022 22:14 )  PTT:26.1 sec    RADIOLOGY & ADDITIONAL TESTS: Reviewed.

## 2022-11-15 NOTE — CHART NOTE - NSCHARTNOTEFT_GEN_A_CORE
iSTOP Review    Patient Name: Caitlyn GarciaBirth Date: 1970  Address: [address] [place] [id number]: Female  Rx Written	Rx Dispensed	Drug	Quantity	Days Supply	Prescriber Name	Prescriber Marylu #	Payment Method	Dispenser  10/17/2022	10/30/2022	hydromorphone 4 mg tablet	30	7	Ruchi Neely	[id number]	Medicare	[facility] [id number]    Patient Name: Caitlyn GarciaBirth Date: 1970  Address: [address] [id number]: Female  Rx Written	Rx Dispensed	Drug	Quantity	Days Supply	Prescriber Name	Prescriber Marylu #	Payment Method	Dispenser  02/28/2022	02/28/2022	hydromorphone hcl powder *	0gm	30	DO Brian Martinez	[id number]	Walter	[facility]    Patient Name: Caitlyn GarciaBirth Date: 1970  Address: [address] [place] [id number]: Female  Rx Written	Rx Dispensed	Drug	Quantity	Days Supply	Prescriber Name	Prescriber Marylu #	Payment Method	Dispenser  11/10/2022	11/11/2022	hydromorphone hcl powder *	0gm	30	DO Brian Martinez	[id number]	Walter	[facility]  10/04/2022	10/04/2022	hydromorphone hcl powder *	0gm	30	DO Brian Martinez	[id number]	Walter	[facility]  09/19/2022	09/23/2022	diphenoxylate-atropine 2.5-0.025 mg tablet	30	8	Laya Vasquez	[id number]	Insurance	[facility]  08/05/2022	08/06/2022	diphenoxylate-atropine 2.5-0.025 mg tablet	30	8	Laya Vasquez	[id number]	Insurance	[facility]  07/18/2022	07/18/2022	hydromorphone hcl powder *	0gm	30	DO Brian Martinez	[id number]	Walter	[facility]  05/04/2022	05/04/2022	hydromorphone hcl powder *	0gm	30	DO Brian Martinez	[id number]	Walter	[facility]    Patient Name: Caitlyn Connor Date: 1970  Address: [address] [place], [place] [address]Sex: Female  Rx Written	Rx Dispensed	Drug	Quantity	Days Supply	Prescriber Name	Prescriber Marylu #	Payment Method	Dispenser  12/21/2021	12/22/2021	hydromorphone hcl powder *	0gm	30	DO Brian Martinez	[id number]	Walter	[facility]  12/01/2021	12/01/2021	hydromorphone hcl powder *	0gm	30	DO Brian Martinez	[id number]	Walter	[facility]

## 2022-11-15 NOTE — PHYSICAL THERAPY INITIAL EVALUATION ADULT - ADDITIONAL COMMENTS
Pt lives alone in an apt~3 flights walk up. Prior to admission, pt ambulated independently without AD.

## 2022-11-15 NOTE — OCCUPATIONAL THERAPY INITIAL EVALUATION ADULT - DIAGNOSIS, OT EVAL
Pt presenting with no new deficits and performing all ADL/functional mobility independently at this time. Further skilled OT services are not warranted, as pt presents at baseline functional independence. OT to discharge from skilled program. Please re-consult OT should pt present with any significant changes in function/new deficits.

## 2022-11-15 NOTE — PHYSICAL THERAPY INITIAL EVALUATION ADULT - SENSATION HOT/COLD, RLE, OT EVAL
SILT except some numbness at R anterior and lateral aspect of thigh <-- Click to add NO significant Past Surgical History

## 2022-11-15 NOTE — OCCUPATIONAL THERAPY INITIAL EVALUATION ADULT - ADDITIONAL COMMENTS
Pt lives alone in apartment with 3 FOS to enter, performing all ADLs/mobility independently and w/o use of AD/AE.

## 2022-11-15 NOTE — PROGRESS NOTE ADULT - PROBLEM SELECTOR PLAN 5
Home med: hydrocortisone 20mg AM and 5mg PM.  - C/w home hydrocortisone PO  - Start solucortef IV 50 q8h per Dr. Torrez

## 2022-11-15 NOTE — PROGRESS NOTE ADULT - PROBLEM SELECTOR PLAN 3
Patient diagnosed in 2005 with pancreatitis 2/2 triglyceridemia. Suffers from associated chronic nausea, vomiting, and abdominal pain. Home meds: vascepa 2g BID,   - Maintenance fluids  - Regular diet as tolerated

## 2022-11-15 NOTE — PROGRESS NOTE ADULT - PROBLEM SELECTOR PLAN 7
F: None   E: Replete for K<4, Mag<2  N: Regular Diet  A: None  Code status: Full  Dispo: [facility]

## 2022-11-15 NOTE — OCCUPATIONAL THERAPY INITIAL EVALUATION ADULT - GENERAL OBSERVATIONS, REHAB EVAL
OT IE completed. Orders received, chart reviewed, pt cleared for OT by REAL Parker. Pt received semi supine in bed, NAD. Pt A&Ox4, agreeable to OT, and tolerated session well.

## 2022-11-15 NOTE — PROGRESS NOTE ADULT - ASSESSMENT
52F w/ HTN, HLD, GERD, chronic nausea/vomiting/abdominal pain 2/2 pancreatitis 2/2 hypertriglyceridemia (dx 2005 at [facility]) w/ pain pump implanted October 2021, chronic nocturnal fevers of unknown etiology, adrenal insufficiency, and postural orthostatic tachycardia syndrome, who complains of 2-month history of right-sided abdominal pain and fevers, concerning for infection around her PCA pump, who presents to [facility] for pump revision surgery by Dr. Watters, admitted for post-op monitoring.

## 2022-11-16 ENCOUNTER — TRANSCRIPTION ENCOUNTER (OUTPATIENT)
Age: 52
End: 2022-11-16

## 2022-11-16 VITALS
OXYGEN SATURATION: 94 % | DIASTOLIC BLOOD PRESSURE: 78 MMHG | RESPIRATION RATE: 18 BRPM | SYSTOLIC BLOOD PRESSURE: 124 MMHG | HEART RATE: 84 BPM | TEMPERATURE: 99 F

## 2022-11-16 LAB
ANION GAP SERPL CALC-SCNC: 6 MMOL/L — SIGNIFICANT CHANGE UP (ref 5–17)
BASOPHILS # BLD AUTO: 0.05 K/UL — SIGNIFICANT CHANGE UP (ref 0–0.2)
BASOPHILS NFR BLD AUTO: 0.5 % — SIGNIFICANT CHANGE UP (ref 0–2)
BILIRUB SERPL-MCNC: 0.8 MG/DL — SIGNIFICANT CHANGE UP (ref 0.2–1.2)
BUN SERPL-MCNC: 14 MG/DL — SIGNIFICANT CHANGE UP (ref 7–23)
CALCIUM SERPL-MCNC: 9 MG/DL — SIGNIFICANT CHANGE UP (ref 8.4–10.5)
CHLORIDE SERPL-SCNC: 103 MMOL/L — SIGNIFICANT CHANGE UP (ref 96–108)
CO2 SERPL-SCNC: 29 MMOL/L — SIGNIFICANT CHANGE UP (ref 22–31)
CREAT SERPL-MCNC: 0.71 MG/DL — SIGNIFICANT CHANGE UP (ref 0.5–1.3)
CULTURE RESULTS: SIGNIFICANT CHANGE UP
EGFR: 102 ML/MIN/1.73M2 — SIGNIFICANT CHANGE UP
EOSINOPHIL # BLD AUTO: 0.1 K/UL — SIGNIFICANT CHANGE UP (ref 0–0.5)
EOSINOPHIL NFR BLD AUTO: 1 % — SIGNIFICANT CHANGE UP (ref 0–6)
GLUCOSE BLDC GLUCOMTR-MCNC: 115 MG/DL — HIGH (ref 70–99)
GLUCOSE BLDC GLUCOMTR-MCNC: 136 MG/DL — HIGH (ref 70–99)
GLUCOSE SERPL-MCNC: 116 MG/DL — HIGH (ref 70–99)
HCT VFR BLD CALC: 38 % — SIGNIFICANT CHANGE UP (ref 34.5–45)
HGB BLD-MCNC: 12 G/DL — SIGNIFICANT CHANGE UP (ref 11.5–15.5)
IMM GRANULOCYTES NFR BLD AUTO: 0.5 % — SIGNIFICANT CHANGE UP (ref 0–0.9)
INR BLD: 1.01 — SIGNIFICANT CHANGE UP (ref 0.88–1.16)
LYMPHOCYTES # BLD AUTO: 2.38 K/UL — SIGNIFICANT CHANGE UP (ref 1–3.3)
LYMPHOCYTES # BLD AUTO: 23.7 % — SIGNIFICANT CHANGE UP (ref 13–44)
MAGNESIUM SERPL-MCNC: 2.1 MG/DL — SIGNIFICANT CHANGE UP (ref 1.6–2.6)
MCHC RBC-ENTMCNC: 29.4 PG — SIGNIFICANT CHANGE UP (ref 27–34)
MCHC RBC-ENTMCNC: 31.6 GM/DL — LOW (ref 32–36)
MCV RBC AUTO: 93.1 FL — SIGNIFICANT CHANGE UP (ref 80–100)
MELD SCORE WITH DIALYSIS: 20 POINTS — SIGNIFICANT CHANGE UP
MELD SCORE WITHOUT DIALYSIS: 7 POINTS — SIGNIFICANT CHANGE UP
MONOCYTES # BLD AUTO: 1.01 K/UL — HIGH (ref 0–0.9)
MONOCYTES NFR BLD AUTO: 10 % — SIGNIFICANT CHANGE UP (ref 2–14)
NEUTROPHILS # BLD AUTO: 6.46 K/UL — SIGNIFICANT CHANGE UP (ref 1.8–7.4)
NEUTROPHILS NFR BLD AUTO: 64.3 % — SIGNIFICANT CHANGE UP (ref 43–77)
NRBC # BLD: 0 /100 WBCS — SIGNIFICANT CHANGE UP (ref 0–0)
PHOSPHATE SERPL-MCNC: 3 MG/DL — SIGNIFICANT CHANGE UP (ref 2.5–4.5)
PLATELET # BLD AUTO: 358 K/UL — SIGNIFICANT CHANGE UP (ref 150–400)
POTASSIUM SERPL-MCNC: 4.1 MMOL/L — SIGNIFICANT CHANGE UP (ref 3.5–5.3)
POTASSIUM SERPL-SCNC: 4.1 MMOL/L — SIGNIFICANT CHANGE UP (ref 3.5–5.3)
PROTHROM AB SERPL-ACNC: 12 SEC — SIGNIFICANT CHANGE UP (ref 10.5–13.4)
RBC # BLD: 4.08 M/UL — SIGNIFICANT CHANGE UP (ref 3.8–5.2)
RBC # FLD: 14.6 % — HIGH (ref 10.3–14.5)
SODIUM SERPL-SCNC: 138 MMOL/L — SIGNIFICANT CHANGE UP (ref 135–145)
SPECIMEN SOURCE: SIGNIFICANT CHANGE UP
WBC # BLD: 10.05 K/UL — SIGNIFICANT CHANGE UP (ref 3.8–10.5)
WBC # FLD AUTO: 10.05 K/UL — SIGNIFICANT CHANGE UP (ref 3.8–10.5)

## 2022-11-16 PROCEDURE — 82962 GLUCOSE BLOOD TEST: CPT

## 2022-11-16 PROCEDURE — 36415 COLL VENOUS BLD VENIPUNCTURE: CPT

## 2022-11-16 PROCEDURE — 87070 CULTURE OTHR SPECIMN AEROBIC: CPT

## 2022-11-16 PROCEDURE — 97161 PT EVAL LOW COMPLEX 20 MIN: CPT

## 2022-11-16 PROCEDURE — 83735 ASSAY OF MAGNESIUM: CPT

## 2022-11-16 PROCEDURE — C1755: CPT

## 2022-11-16 PROCEDURE — 84295 ASSAY OF SERUM SODIUM: CPT

## 2022-11-16 PROCEDURE — 83036 HEMOGLOBIN GLYCOSYLATED A1C: CPT

## 2022-11-16 PROCEDURE — 85610 PROTHROMBIN TIME: CPT

## 2022-11-16 PROCEDURE — 84100 ASSAY OF PHOSPHORUS: CPT

## 2022-11-16 PROCEDURE — 85018 HEMOGLOBIN: CPT

## 2022-11-16 PROCEDURE — 86850 RBC ANTIBODY SCREEN: CPT

## 2022-11-16 PROCEDURE — 87075 CULTR BACTERIA EXCEPT BLOOD: CPT

## 2022-11-16 PROCEDURE — 80048 BASIC METABOLIC PNL TOTAL CA: CPT

## 2022-11-16 PROCEDURE — 82330 ASSAY OF CALCIUM: CPT

## 2022-11-16 PROCEDURE — 80053 COMPREHEN METABOLIC PANEL: CPT

## 2022-11-16 PROCEDURE — 88304 TISSUE EXAM BY PATHOLOGIST: CPT

## 2022-11-16 PROCEDURE — 85730 THROMBOPLASTIN TIME PARTIAL: CPT

## 2022-11-16 PROCEDURE — 86901 BLOOD TYPING SEROLOGIC RH(D): CPT

## 2022-11-16 PROCEDURE — C9399: CPT

## 2022-11-16 PROCEDURE — 87186 SC STD MICRODIL/AGAR DIL: CPT

## 2022-11-16 PROCEDURE — C1772: CPT

## 2022-11-16 PROCEDURE — 99239 HOSP IP/OBS DSCHRG MGMT >30: CPT

## 2022-11-16 PROCEDURE — 80061 LIPID PANEL: CPT

## 2022-11-16 PROCEDURE — 84132 ASSAY OF SERUM POTASSIUM: CPT

## 2022-11-16 PROCEDURE — 83690 ASSAY OF LIPASE: CPT

## 2022-11-16 PROCEDURE — 85025 COMPLETE CBC W/AUTO DIFF WBC: CPT

## 2022-11-16 PROCEDURE — 85027 COMPLETE CBC AUTOMATED: CPT

## 2022-11-16 PROCEDURE — 86900 BLOOD TYPING SEROLOGIC ABO: CPT

## 2022-11-16 PROCEDURE — 82947 ASSAY GLUCOSE BLOOD QUANT: CPT

## 2022-11-16 RX ORDER — HYDROMORPHONE HYDROCHLORIDE 2 MG/ML
0.5 INJECTION INTRAMUSCULAR; INTRAVENOUS; SUBCUTANEOUS ONCE
Refills: 0 | Status: DISCONTINUED | OUTPATIENT
Start: 2022-11-16 | End: 2022-11-16

## 2022-11-16 RX ORDER — HYDROCORTISONE 20 MG
50 TABLET ORAL
Qty: 0 | Refills: 0 | DISCHARGE

## 2022-11-16 RX ORDER — ERYTHROMYCIN ETHYLSUCCINATE 400 MG
2 TABLET ORAL
Qty: 540 | Refills: 3
Start: 2022-11-16 | End: 2023-11-10

## 2022-11-16 RX ORDER — ERYTHROMYCIN ETHYLSUCCINATE 400 MG
2 TABLET ORAL
Qty: 180 | Refills: 6
Start: 2022-11-16 | End: 2023-06-13

## 2022-11-16 RX ADMIN — HYDROMORPHONE HYDROCHLORIDE 0.5 MILLIGRAM(S): 2 INJECTION INTRAMUSCULAR; INTRAVENOUS; SUBCUTANEOUS at 13:27

## 2022-11-16 RX ADMIN — HYDROMORPHONE HYDROCHLORIDE 0.5 MILLIGRAM(S): 2 INJECTION INTRAMUSCULAR; INTRAVENOUS; SUBCUTANEOUS at 13:12

## 2022-11-16 RX ADMIN — Medication 5 MILLIGRAM(S): at 05:51

## 2022-11-16 RX ADMIN — Medication 200 MILLIGRAM(S): at 05:49

## 2022-11-16 RX ADMIN — PANTOPRAZOLE SODIUM 40 MILLIGRAM(S): 20 TABLET, DELAYED RELEASE ORAL at 06:32

## 2022-11-16 RX ADMIN — CELECOXIB 100 MILLIGRAM(S): 200 CAPSULE ORAL at 06:32

## 2022-11-16 RX ADMIN — ENOXAPARIN SODIUM 40 MILLIGRAM(S): 100 INJECTION SUBCUTANEOUS at 05:52

## 2022-11-16 RX ADMIN — Medication 1 TABLET(S): at 00:22

## 2022-11-16 RX ADMIN — HYDROMORPHONE HYDROCHLORIDE 0.5 MILLIGRAM(S): 2 INJECTION INTRAMUSCULAR; INTRAVENOUS; SUBCUTANEOUS at 02:35

## 2022-11-16 RX ADMIN — GABAPENTIN 300 MILLIGRAM(S): 400 CAPSULE ORAL at 05:52

## 2022-11-16 RX ADMIN — Medication 100 MILLIGRAM(S): at 05:49

## 2022-11-16 RX ADMIN — Medication 500 MILLIGRAM(S): at 13:19

## 2022-11-16 RX ADMIN — Medication 1 TABLET(S): at 05:52

## 2022-11-16 RX ADMIN — Medication 5 MILLIGRAM(S): at 00:22

## 2022-11-16 RX ADMIN — HYDROMORPHONE HYDROCHLORIDE 0.5 MILLIGRAM(S): 2 INJECTION INTRAMUSCULAR; INTRAVENOUS; SUBCUTANEOUS at 07:31

## 2022-11-16 RX ADMIN — CELECOXIB 100 MILLIGRAM(S): 200 CAPSULE ORAL at 07:22

## 2022-11-16 RX ADMIN — Medication 5 MILLIGRAM(S): at 13:12

## 2022-11-16 RX ADMIN — GABAPENTIN 300 MILLIGRAM(S): 400 CAPSULE ORAL at 13:12

## 2022-11-16 RX ADMIN — Medication 20 MILLIGRAM(S): at 07:31

## 2022-11-16 RX ADMIN — HYDROMORPHONE HYDROCHLORIDE 0.5 MILLIGRAM(S): 2 INJECTION INTRAMUSCULAR; INTRAVENOUS; SUBCUTANEOUS at 08:00

## 2022-11-16 RX ADMIN — HYDROMORPHONE HYDROCHLORIDE 0.5 MILLIGRAM(S): 2 INJECTION INTRAMUSCULAR; INTRAVENOUS; SUBCUTANEOUS at 02:20

## 2022-11-16 NOTE — DISCHARGE NOTE NURSING/CASE MANAGEMENT/SOCIAL WORK - PATIENT PORTAL LINK FT
You can access the FollowMyHealth Patient Portal offered by [facility] by registering at the following website: [email or web address]. By joining [facility]’s FollowMyHealth portal, you will also be able to view your health information using other applications (apps) compatible with our system.

## 2022-11-16 NOTE — DISCHARGE NOTE PROVIDER - NSDCMRMEDTOKEN_GEN_ALL_CORE_FT
Baqsimi One Pack 3 mg nasal powder:   Bystolic 2.5 mg oral tablet: 1 tab(s) orally once a day  CeleBREX 100 mg oral capsule: 1 cap(s) orally 2 times a day  Compazine 5 mg oral tablet: 1 tab(s) orally 3 times a day  domperidone: 10 milligram(s) orally once a day  Emend 80 mg oral capsule: 1 cap(s) orally once a day (in the morning)  ezetimibe 10 mg oral tablet: 1 tab(s) orally once a day  HumaLOG 100 units/mL injectable solution: Sliding Scale (in pump)  hydrocortisone 20 mg oral tablet: milligram(s) orally once a day (in the morning)  hydrocortisone 5 mg oral tablet: orally once a day (in the evening)  Imodium 2 mg oral capsule: 1 cap(s) orally every 4 hours  Invokana 300 mg oral tablet: 1 tab(s) orally once a day  Lasix 20 mg oral tablet: 1 tab(s) orally once a day, As Needed  Lipitor 40 mg oral tablet: 1 tab(s) orally once a day  Lomotil 2.5 mg-0.025 mg oral tablet: 2 tab(s) orally 4 times a day  loratadine 10 mg oral tablet: 2 tab(s) orally every 24 hours  metFORMIN 1000 mg oral tablet: 1 tab(s) orally 2 times a day  Neurontin 300 mg oral capsule: 1 cap(s) orally 3 times a day  Protonix 40 mg oral delayed release tablet: 1 tab(s) orally once a day  Solu-CORTEF: 50 milligram(s) injectable once a day  Vascepa 1 g oral capsule: 2 cap(s) orally 2 times a day  Vitamin D3 50 mcg (2000 intl units) oral capsule: 1 cap(s) orally once a day  Vitamin D3 50 mcg (2000 intl units) oral tablet: 1 tab(s) orally once a day  Voltaren 1% topical gel:   Zofran ODT 8 mg oral tablet, disintegratin tab(s) orally 3 times a day, As Needed  
no

## 2022-11-16 NOTE — DISCHARGE NOTE PROVIDER - CARE PROVIDER_API CALL
Do Brian Scherer Tejeda  Anesthesiology  [address] [place] [address]  Phone: [phone]  Fax: [phone]  Follow Up Time:

## 2022-11-16 NOTE — DISCHARGE NOTE PROVIDER - NSDCCPTREATMENT_GEN_ALL_CORE_FT
PRINCIPAL PROCEDURE  Procedure: Revision of pain pump  Findings and Treatment: ·  PRE-OP DIAGNOSIS:  Chronic pain disorder 14-Nov-2022 20:50:50  Do Brian Scherer  ·  POST-OP DIAGNOSIS:  Chronic pain disorder 14-Nov-2022 20:51:05  Do Brian Scherer  ·  PROCEDURES:  Revision of pain pump 14-Nov-2022 20:49:24  Do Brian Scherer  Removal, pain pump 14-Nov-2022 20:49:42  Do Brian Scherer  Revision of intrathecal catheter 14-Nov-2022 20:49:53  Do Brian Scherer  Puncture, shunt tubing or reservoir 14-Nov-2022 20:50:08  Do Brian Scherer  Adjacent tissue transfer of buttock, defect size 5.1 to 10.0 sq cm 14-Nov-2022 20:50:31  Do Brian Scherer

## 2022-11-16 NOTE — DISCHARGE NOTE PROVIDER - NSDCCPCAREPLAN_GEN_ALL_CORE_FT
PRINCIPAL DISCHARGE DIAGNOSIS  Diagnosis: History of chronic pancreatitis  Assessment and Plan of Treatment: Chronic pancreatitis is long-lasting inflammation and scarring of the pancreas. The pancreas is a gland that is located behind the stomach. It makes enzymes that help to digest food. The pancreas also releases hormones called glucagon and insulin, which help regulate blood sugar (glucose). Damage to the pancreas may affect digestion, cause pain in the upper abdomen and back, and may cause diabetes. Inflammation can also irritate other organs in the abdomen near the pancreas.At first, pancreatitis may be sudden (acute). If you have several or prolonged episodes of acute pancreatitis, the condition can turn into chronic pancreatitis. Common causes of this condition are alcohol abuse, gallstones, high (elevated) levels of triglycerides, and certain medicines. This condition is sometimes treated at a hospital and may involve resting the pancreas, controlling pain, replacing enzymes, and avoiding alcohol.  Take over-the-counter and prescription medicines only as told by your health care provider. These include vitamin supplements. Do not drive or use heavy machinery while taking prescription pain medicine. If you are taking prescription pain medicine, take actions to prevent or treat constipation. Your health care provider may recommend that you: Take an over-the-counter or prescription medicine for constipation. Eat foods that are high in fiber such as whole grains and beans. Limit foods that are high in fat and processed sugars, such as fried or sweet foods. Do not use any products that contain nicotine or tobacco, such as cigarettes and e-cigarettes. If you need help quitting, ask your health care provider. If recommended by your health care provider, monitor your blood glucose at home. Keep all follow-up visits as told by your health care provider. This is important.        SECONDARY DISCHARGE DIAGNOSES  Diagnosis: David's disease  Assessment and Plan of Treatment: [place]'s disease, which is also called primary adrenal insufficiency, is a condition in which the adrenal glands do not make enough of the hormones cortisol and aldosterone. The two adrenal glands are located above each kidney. The disease causes blood pressure to drop and causes potassium to build up to dangerous levels. If [place]'s disease is not treated, it can suddenly get worse and become life-threatening. This is called an adrenal crisis(addisonian crisis). It is very important that you talk with your health care provider and understand how to adjust your medicine dosages if you become ill or stressed or if you are going to have surgery. Take over-the-counter and prescription medicines only as told by your health care provider. Keep all follow-up visits. This is important.

## 2022-11-16 NOTE — DISCHARGE NOTE NURSING/CASE MANAGEMENT/SOCIAL WORK - NSDCVIVACCINE_GEN_ALL_CORE_FT
influenza, injectable, quadrivalent, preservative free; 21-Oct-2021 10:08; Dameon Carver (RN); [employer]; [id number] (Exp. Date: 30-Jun-2022); IntraMuscular; Deltoid Left.; 0.5 milliLiter(s); VIS (VIS Published: 06-Aug-2021, VIS Presented: 21-Oct-2021);

## 2022-11-16 NOTE — DISCHARGE NOTE PROVIDER - HOSPITAL COURSE
The patient is a 52F w/ HTN, HLD, GERD, chronic nausea/vomiting/abdominal pain 2/2 pancreatitis 2/2 hypertriglyceridemia (dx 2005 at [facility]) w/ pain pump implanted October 2021, chronic nocturnal fevers of unknown etiology, adrenal insufficiency, and postural orthostatic tachycardia syndrome, who complains of 2-month history of right-sided abdominal pain and fevers, concerning for infection around her PCA pump, who presented to [facility] for pump revision surgery by Dr. Watters, admitted to medicine for post-op monitoring. Inpatient surgery team was consulted who did the pump revision surgery with pain management. Steroids were given to manage her chronic leah's disease. Hydrocortisone schedule: 20mg @ 8am, 5mg @ 8pm. d/c'd ISS, pt has her own insulin pump. The patient will be discharged with close outpatient follow up, and continued pain management.     #S/P insertion of intrathecal pump.   Patient presents with pain in right-abdominal pain pump ongoing for 2 months, now s/p revision of pump and placement in abdomen. Inserted in October 2021 initially. First revision surgery this admission. Admitted to medicine for post-op monitoring for infection. Surgical swab showed rare staph pseudointermedius.   - Pain management per medtronic pain pump  - Ciprofloxacin 600 q6 for post-op infection prevention  - Close outpatient follow-up    #Nausea and vomiting.   Patient suffers from chronic nausea, vomiting, and abdominal pain 2/2 pancreatitis. Home meds: protonix 40, zofran ODT 8mg PRN, compazine 5 q6, motillium 10 q8, arepitant 80 daily PRN, immodium 2mg q6 PRN, lomotil 2.5/0.025 q6 PRN, celebrex 100 q6, gabapentin 300 TID.  - cw home medications  - Regular diet as tolerated.    #History of chronic pancreatitis.   Patient diagnosed in 2005 with pancreatitis 2/2 triglyceridemia. Suffers from associated chronic nausea, vomiting, and abdominal pain. Home meds: vascepa 2g BID,   - cw home meds  - Regular diet as tolerated    #DM (diabetes mellitus).   Home meds: insulin sliding scale, metformin 1000 BID, invokana 300  - cw home meds    #[place]'s disease.   Home med: hydrocortisone 20mg AM and 5mg PM.  - cw home hydrocortisone PO    #HTN (hypertension).   H/o HTN. Normotensive post-op.  - cw home bystolic 2.5 daily, furosemide 20 daily PRN      Physical Exam:  General: NAD; speaking in full sentences, obese  HEENT: NC/AT; PERRL; EOMI; MMM  Neck: supple; no JVD  Cardiac: RRR; +S1/S2  Pulm: CTA B/L; no W/R/R  GI: soft, NT/ND, +BS  Extremities: WWP; no edema, clubbing or cyanosis, surgical dressing R hip/gluteal region  Vasc: 2+ radial, DP pulses B/L  Neuro: AAOx3; no focal deficits The patient is a 52F w/ HTN, HLD, GERD, chronic nausea/vomiting/abdominal pain 2/2 pancreatitis 2/2 hypertriglyceridemia (dx 2005 at [facility]) w/ pain pump implanted October 2021, chronic nocturnal fevers of unknown etiology, adrenal insufficiency, and postural orthostatic tachycardia syndrome, who complains of 2-month history of right-sided abdominal pain and fevers, concerning for infection around her PCA pump, who presented to [facility] for pump revision surgery by Dr. Watters, admitted to medicine for post-op monitoring. Inpatient surgery team was consulted who did the pump revision surgery with pain management. Steroids were given to manage her chronic leah's disease. Hydrocortisone schedule: 20mg @ 8am, 5mg @ 8pm. d/c'd ISS, pt has her own insulin pump. The patient will be discharged with close outpatient follow up, and continued pain management.     #S/P insertion of intrathecal pump.   Patient presents with pain in right-abdominal pain pump ongoing for 2 months, now s/p revision of pump and placement in abdomen. Inserted in October 2021 initially. First revision surgery this admission. Admitted to medicine for post-op monitoring for infection. Surgical swab showed rare staph pseudointermedius.   - Pain management per medtronic pain pump  - Ciprofloxacin 600 q6 for post-op infection prevention  - Close outpatient follow-up with Dr. Watters (anesthesiologist)     #Nausea and vomiting.   Patient suffers from chronic nausea, vomiting, and abdominal pain 2/2 pancreatitis. Home meds: protonix 40, zofran ODT 8mg PRN, compazine 5 q6, motillium 10 q8, arepitant 80 daily PRN, immodium 2mg q6 PRN, lomotil 2.5/0.025 q6 PRN, celebrex 100 q6, gabapentin 300 TID.  - cw home medications  - Regular diet as tolerated.    #History of chronic pancreatitis.   Patient diagnosed in 2005 with pancreatitis 2/2 triglyceridemia. Suffers from associated chronic nausea, vomiting, and abdominal pain. Home meds: vascepa 2g BID,   - cw home meds  - Regular diet as tolerated    #DM (diabetes mellitus).   Home meds: insulin sliding scale, metformin 1000 BID, invokana 300  - cw home meds    #[place]'s disease.   Home med: hydrocortisone 20mg AM and 5mg PM.  - cw home hydrocortisone PO    #HTN (hypertension).   H/o HTN. Normotensive post-op.  - cw home bystolic 2.5 daily, furosemide 20 daily PRN      Physical Exam:  General: NAD; speaking in full sentences, obese  HEENT: NC/AT; PERRL; EOMI; MMM  Neck: supple; no JVD  Cardiac: RRR; +S1/S2  Pulm: CTA B/L; no W/R/R  GI: soft, NT/ND, +BS  Extremities: WWP; no edema, clubbing or cyanosis, surgical dressing R hip/gluteal region  Vasc: 2+ radial, DP pulses B/L  Neuro: AAOx3; no focal deficits The patient is a 52F w/ HTN, HLD, GERD, chronic nausea/vomiting/abdominal pain 2/2 pancreatitis 2/2 hypertriglyceridemia (dx 2005 at [facility]) w/ pain pump implanted October 2021, chronic nocturnal fevers of unknown etiology, adrenal insufficiency, and postural orthostatic tachycardia syndrome, who complains of 2-month history of right-sided abdominal pain and fevers, concerning for infection around her PCA pump, who presented to [facility] for pump revision surgery by Dr. Watters, admitted to medicine for post-op monitoring. Inpatient surgery team was consulted who did the pump revision surgery with pain management. Steroids were given to manage her chronic leah's disease. Hydrocortisone schedule: 20mg @ 8am, 5mg @ 8pm. d/c'd ISS, pt has her own insulin pump. The patient will be discharged with close outpatient follow up, and continued pain management.     #S/P insertion of intrathecal pump.   Patient presents with pain in right-abdominal pain pump ongoing for 2 months, now s/p revision of pump and placement in abdomen. Inserted in October 2021 initially. First revision surgery this admission. Admitted to medicine for post-op monitoring for infection. Surgical swab showed rare staph pseudointermedius, which is common f   - Pain management per medtronic pain pump  - Ciprofloxacin 600 q6 for post-op infection prevention  - Close outpatient follow-up with Dr. Watters (anesthesiologist)     #Nausea and vomiting.   Patient suffers from chronic nausea, vomiting, and abdominal pain 2/2 pancreatitis. Home meds: protonix 40, zofran ODT 8mg PRN, compazine 5 q6, motillium 10 q8, arepitant 80 daily PRN, immodium 2mg q6 PRN, lomotil 2.5/0.025 q6 PRN, celebrex 100 q6, gabapentin 300 TID.  - cw home medications  - Regular diet as tolerated.    #History of chronic pancreatitis.   Patient diagnosed in 2005 with pancreatitis 2/2 triglyceridemia. Suffers from associated chronic nausea, vomiting, and abdominal pain. Home meds: vascepa 2g BID,   - cw home meds  - Regular diet as tolerated    #DM (diabetes mellitus).   Home meds: insulin sliding scale, metformin 1000 BID, invokana 300  - cw home meds    #[place]'s disease.   Home med: hydrocortisone 20mg AM and 5mg PM.  - cw home hydrocortisone PO    #HTN (hypertension).   H/o HTN. Normotensive post-op.  - cw home bystolic 2.5 daily, furosemide 20 daily PRN      Physical Exam:  General: NAD; speaking in full sentences, obese  HEENT: NC/AT; PERRL; EOMI; MMM  Neck: supple; no JVD  Cardiac: RRR; +S1/S2  Pulm: CTA B/L; no W/R/R  GI: soft, NT/ND, +BS  Extremities: WWP; no edema, clubbing or cyanosis, surgical dressing R hip/gluteal region  Vasc: 2+ radial, DP pulses B/L  Neuro: AAOx3; no focal deficits

## 2022-11-16 NOTE — DISCHARGE NOTE PROVIDER - ATTENDING DISCHARGE PHYSICAL EXAMINATION:
Physical exam:  GENERAL: NAD, lying in bed comfortably  HEAD:  Atraumatic, Normocephalic  EYES: EOMI, conjunctiva and sclera clear  ENT: Moist mucous membranes  NECK: no jvd  CHEST/LUNG: observed to be breathing with no respiratory distress  ABDOMEN: Soft, Nontender, Nondistended, pump site is clean   EXTREMITIES:  No clubbing, cyanosis   NERVOUS SYSTEM:  Alert & Oriented , speech clear   MSK: FROM all 4 extremities   SKIN: right gluteus skin with no erythema, bandages in place

## 2022-11-16 NOTE — DISCHARGE NOTE NURSING/CASE MANAGEMENT/SOCIAL WORK - NSDCPEFALRISK_GEN_ALL_CORE
For information on Fall & Injury Prevention, visit: [email or web address] OR  [email or web address] OR  https://www.cdc.gov/steadi/patient.html

## 2022-11-18 LAB
-  CLINDAMYCIN: SIGNIFICANT CHANGE UP
-  ERYTHROMYCIN: SIGNIFICANT CHANGE UP
-  LINEZOLID: SIGNIFICANT CHANGE UP
-  OXACILLIN: SIGNIFICANT CHANGE UP
-  RIFAMPIN: SIGNIFICANT CHANGE UP
-  TRIMETHOPRIM/SULFAMETHOXAZOLE: SIGNIFICANT CHANGE UP
-  VANCOMYCIN: SIGNIFICANT CHANGE UP
CULTURE RESULTS: SIGNIFICANT CHANGE UP
METHOD TYPE: SIGNIFICANT CHANGE UP
ORGANISM # SPEC MICROSCOPIC CNT: SIGNIFICANT CHANGE UP
ORGANISM # SPEC MICROSCOPIC CNT: SIGNIFICANT CHANGE UP
SPECIMEN SOURCE: SIGNIFICANT CHANGE UP

## 2022-12-01 DIAGNOSIS — E78.1 PURE HYPERGLYCERIDEMIA: ICD-10-CM

## 2022-12-01 DIAGNOSIS — R11.2 NAUSEA WITH VOMITING, UNSPECIFIED: ICD-10-CM

## 2022-12-01 DIAGNOSIS — G89.29 OTHER CHRONIC PAIN: ICD-10-CM

## 2022-12-01 DIAGNOSIS — R10.9 UNSPECIFIED ABDOMINAL PAIN: ICD-10-CM

## 2022-12-01 DIAGNOSIS — T85.615A BREAKDOWN (MECHANICAL) OF OTHER NERVOUS SYSTEM DEVICE, IMPLANT OR GRAFT, INITIAL ENCOUNTER: ICD-10-CM

## 2022-12-01 DIAGNOSIS — I10 ESSENTIAL (PRIMARY) HYPERTENSION: ICD-10-CM

## 2022-12-01 DIAGNOSIS — Z96.41 PRESENCE OF INSULIN PUMP (EXTERNAL) (INTERNAL): ICD-10-CM
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
## 2022-12-01 DIAGNOSIS — Z79.4 LONG TERM (CURRENT) USE OF INSULIN: ICD-10-CM

## 2022-12-01 DIAGNOSIS — K21.9 GASTRO-ESOPHAGEAL REFLUX DISEASE WITHOUT ESOPHAGITIS: ICD-10-CM

## 2022-12-01 DIAGNOSIS — E78.5 HYPERLIPIDEMIA, UNSPECIFIED: ICD-10-CM

## 2022-12-01 DIAGNOSIS — K86.1 OTHER CHRONIC PANCREATITIS: ICD-10-CM

## 2022-12-01 DIAGNOSIS — E27.1 PRIMARY ADRENOCORTICAL INSUFFICIENCY: ICD-10-CM

## 2022-12-01 DIAGNOSIS — E11.9 TYPE 2 DIABETES MELLITUS WITHOUT COMPLICATIONS: ICD-10-CM

## 2022-12-01 DIAGNOSIS — Z88.0 ALLERGY STATUS TO PENICILLIN: ICD-10-CM

## 2022-12-01 DIAGNOSIS — Z88.9 ALLERGY STATUS TO UNSPECIFIED DRUGS, MEDICAMENTS AND BIOLOGICAL SUBSTANCES: ICD-10-CM

## 2022-12-01 DIAGNOSIS — E66.01 MORBID (SEVERE) OBESITY DUE TO EXCESS CALORIES: ICD-10-CM

## 2024-02-05 NOTE — DISCHARGE NOTE NURSING/CASE MANAGEMENT/SOCIAL WORK - HAVE YOU HAD COVID IN THE LAST 60 DAYS?
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  Questionable history internal derangement right knee with no current sx's.  Encouraged home exercise program.  If patient has significant symptoms, would consider physical therapy.  Continue to monitor.   No

## 2025-04-28 NOTE — PATIENT PROFILE ADULT - NSPROHMDIABETBLDGLCTARGET_GEN_A_NUR
Your Diagnosis is:  Kidney stone    Return to the Emergency Department for fever, unable to urinate at all for several hours, vomiting and unable to keep fluids down, worsened pain not controlled by your pain medications, if symptoms worsen or for any other concerns.    Additional instructions:  Someone will call you to schedule urology follow up appointment.   Strain your urine  Drink lots of fluids  
known

## 2025-06-26 NOTE — PHYSICAL THERAPY INITIAL EVALUATION ADULT - GAIT DEVIATIONS NOTED, PT EVAL
Medical Necessity Information: LCD Guidelines vary from payer to payer. Please check with your payer's policy to determine medical necessity. steady gait, no lose of balance,

## (undated) DEVICE — DRAPE IOBAN 23" X 23"

## (undated) DEVICE — DRSG TEGADERM 4X10"

## (undated) DEVICE — REFIL TY PMP SYNCHROMED

## (undated) DEVICE — GLV 6.5 PROTEXIS (WHITE)

## (undated) DEVICE — PREP CHLORAPREP HI-LITE ORANGE 26ML

## (undated) DEVICE — DRAPE TOP SHEET 53" X 101"

## (undated) DEVICE — SUT MONOCRYL 3-0 18" PS-1

## (undated) DEVICE — DRSG AQUACEL 3.5 X 10"

## (undated) DEVICE — TRAY ANES SPINAL EPI COMBO W DRUGS

## (undated) DEVICE — SYR LUER LOK 3CC

## (undated) DEVICE — SUT VICRYL 3-0 18" SH UNDYED (POP-OFF)

## (undated) DEVICE — SUT ETHIBOND EXCEL 2-0 30"

## (undated) DEVICE — DRAPE C ARM 41X74"

## (undated) DEVICE — SUT ETHIBOND 2-0 30" CT-1

## (undated) DEVICE — SUT VICRYL 2-0 18" CT-1 UNDYED (POP-OFF)

## (undated) DEVICE — VENODYNE/SCD SLEEVE CALF MEDIUM

## (undated) DEVICE — SYR LOR GLASS LUER SLIP 5CC

## (undated) DEVICE — SUT VICRYL 0 27" UR-5

## (undated) DEVICE — SUT VICRYL 3-0 18" SH (POP-OFF)

## (undated) DEVICE — GLV 6.5 PROTEXIS (BLUE)

## (undated) DEVICE — INFUS SYS IMP CATH SYNCHROMED

## (undated) DEVICE — DRSG DERMABOND 0.7ML

## (undated) DEVICE — MARKING PEN W RULER

## (undated) DEVICE — SPECIMEN CONTAINER 100ML

## (undated) DEVICE — SUT ETHIBOND 2-0 30" RB-1

## (undated) DEVICE — DRAPE C ARM C-ARMOUR

## (undated) DEVICE — DRAPE ULTRASOUND PROBE COVER NEOGAURD LATEX FREE

## (undated) DEVICE — DRAPE SPLIT SHEET 77" X 108"

## (undated) DEVICE — SYR LUER LOK 10CC

## (undated) DEVICE — SUT SILK 2-0 30" PSL

## (undated) DEVICE — PACK GENERAL MINOR